# Patient Record
Sex: FEMALE | Race: BLACK OR AFRICAN AMERICAN | Employment: OTHER | URBAN - METROPOLITAN AREA
[De-identification: names, ages, dates, MRNs, and addresses within clinical notes are randomized per-mention and may not be internally consistent; named-entity substitution may affect disease eponyms.]

---

## 2020-09-12 ENCOUNTER — TELEPHONE (OUTPATIENT)
Dept: FAMILY MEDICINE CLINIC | Age: 65
End: 2020-09-12

## 2020-09-12 NOTE — TELEPHONE ENCOUNTER
----- Message from Terrence Pichardo sent at 9/8/2020  2:00 PM EDT -----  Regarding: Dr. Tamika Bansal first and last name and relationship to patient (if not the patient): Rosalba Baker (pt's niece)  Best contact number: [phone]  Preferred date and time: First avail in practice  Scheduled appointment date and time: n/a  Reason for appointment: NP est w/provider  Details to clarify the request: n/a      Outbound call to patients nitin Medina. Left message to return call back to the office regarding appointment.

## 2020-10-19 ENCOUNTER — OFFICE VISIT (OUTPATIENT)
Dept: FAMILY MEDICINE CLINIC | Age: 65
End: 2020-10-19
Payer: COMMERCIAL

## 2020-10-19 VITALS
RESPIRATION RATE: 16 BRPM | BODY MASS INDEX: 33.14 KG/M2 | WEIGHT: 164.4 LBS | OXYGEN SATURATION: 97 % | HEIGHT: 59 IN | SYSTOLIC BLOOD PRESSURE: 140 MMHG | HEART RATE: 92 BPM | DIASTOLIC BLOOD PRESSURE: 82 MMHG

## 2020-10-19 DIAGNOSIS — R73.9 ELEVATED BLOOD SUGAR: ICD-10-CM

## 2020-10-19 DIAGNOSIS — E78.5 HYPERLIPIDEMIA, UNSPECIFIED HYPERLIPIDEMIA TYPE: ICD-10-CM

## 2020-10-19 DIAGNOSIS — M17.0 PRIMARY OSTEOARTHRITIS OF BOTH KNEES: ICD-10-CM

## 2020-10-19 DIAGNOSIS — Z87.09 HISTORY OF ASTHMA: ICD-10-CM

## 2020-10-19 DIAGNOSIS — Z11.59 NEED FOR HEPATITIS C SCREENING TEST: ICD-10-CM

## 2020-10-19 DIAGNOSIS — Z76.89 ENCOUNTER TO ESTABLISH CARE: Primary | ICD-10-CM

## 2020-10-19 DIAGNOSIS — E66.09 CLASS 1 OBESITY DUE TO EXCESS CALORIES WITH SERIOUS COMORBIDITY AND BODY MASS INDEX (BMI) OF 30.0 TO 30.9 IN ADULT: ICD-10-CM

## 2020-10-19 DIAGNOSIS — I10 ESSENTIAL HYPERTENSION: ICD-10-CM

## 2020-10-19 DIAGNOSIS — Z12.31 ENCOUNTER FOR SCREENING MAMMOGRAM FOR MALIGNANT NEOPLASM OF BREAST: ICD-10-CM

## 2020-10-19 DIAGNOSIS — Z00.00 WELL WOMAN EXAM (NO GYNECOLOGICAL EXAM): ICD-10-CM

## 2020-10-19 DIAGNOSIS — Z23 NEEDS FLU SHOT: ICD-10-CM

## 2020-10-19 PROCEDURE — 90471 IMMUNIZATION ADMIN: CPT | Performed by: FAMILY MEDICINE

## 2020-10-19 PROCEDURE — 90694 VACC AIIV4 NO PRSRV 0.5ML IM: CPT | Performed by: FAMILY MEDICINE

## 2020-10-19 PROCEDURE — 99387 INIT PM E/M NEW PAT 65+ YRS: CPT | Performed by: FAMILY MEDICINE

## 2020-10-19 RX ORDER — VITAMIN E 1000 UNIT
CAPSULE ORAL
COMMUNITY

## 2020-10-19 RX ORDER — HYDROCHLOROTHIAZIDE 25 MG/1
TABLET ORAL
COMMUNITY
Start: 2020-10-14 | End: 2020-11-11 | Stop reason: SDUPTHER

## 2020-10-19 RX ORDER — AMLODIPINE BESYLATE 10 MG/1
TABLET ORAL
COMMUNITY
Start: 2020-10-14 | End: 2020-11-11 | Stop reason: SDUPTHER

## 2020-10-19 RX ORDER — CALCIUM CARB/VITAMIN D3/VIT K1 500-500-40
TABLET,CHEWABLE ORAL
COMMUNITY

## 2020-10-19 NOTE — PROGRESS NOTES
[address] [facility] Note      Subjective:     Chief Complaint   Patient presents with   Zayda Orellana New Patient   [address]     Kenya Fernando is a 71y.o. year old female who presents for evaluation of the following:      Establishment of Care:  Previous PCP: Dr. Maki Segura, last seen 2019 in 8 [facility]  Patient Care Team:  Alix Knox MD as PCP - General (Family Medicine)   Dentist- None  Optho- None  GYN- None  Urology- Name unknown in [address] [facility]    PMH:   Hypertension:   Chronic. Diagnosed > 10 years  Home monitoring: Yes, no log in office  Treatment:   Key CAD CHF Meds             amLODIPine (NORVASC) 10 mg tablet (Taking) TAKE 1 TABLET BY MOUTH ONCE DAILY    hydroCHLOROthiazide (HYDRODIURIL) 25 mg tablet (Taking) TAKE 1 TABLET BY MOUTH ONCE DAILY    docosahexaenoic acid/epa (FISH OIL PO) (Taking) Take  by mouth. Not adhering to strict low salt diet  Complications: No MI Or stroke  Co-morbidities: Obesity  BP Readings from Last 3 Encounters:   10/19/20 (!) 140/82     Asthma:  Since childhood  No symptoms recently  Last albuterol use 1 year ago    S/p hysterectomy    Histroy of Urinary Urgency  s/p bladder procedure to correct this in [place]    Has heavy [facility] accent in [place] language.  States English  Denies history of dementia  ADL Assessment 10/19/2020   Feeding yourself No Help Needed   Getting from bed to chair No Help Needed   Getting dressed No Help Needed   Bathing or showering No Help Needed   Walk across the room (includes cane/walker) No Help Needed   Using the telphone No Help Needed   Taking your medications No Help Needed   Preparing meals No Help Needed   Managing money (expenses/bills) No Help Needed   Moderately strenuous housework (laundry) No Help Needed   Shopping for personal items (toiletries/medicines) No Help Needed   Shopping for groceries No Help Needed   Driving Help Needed   Climbing a flight of stairs No Help Needed   Getting to places beyond walking distances Help Needed     Obesity:   Patient perception: \"I think its up\"  Motivation:   Diet: unrestricted   -No food log in office  Activity: None   Treatment:  Key Obesity Meds             hydroCHLOROthiazide (HYDRODIURIL) 25 mg tablet (Taking) TAKE 1 TABLET BY MOUTH ONCE DAILY      Previous Treatment/Attempts: No  Last Weight Metrics:  Weight Loss Metrics 10/19/2020   Today's Wt 164 lb 6.4 oz   BMI 33.2 kg/m2       Arthtiris:   Location bilateral Knee pain and left hip pain  Chronic   Evidence on Xray       Acute Concerns:  None    Social:   Employment- retired, gets [employer]  Lives with adult daughter  Originally from [facility] [address] Maintenance   Topic Date Due    Hepatitis C Screening  1955    DTaP/Tdap/Td series (1 - Tdap) 12/18/1975    Lipid Screen  12/18/1994    Shingrix Vaccine Age 50> (1 of 2) 12/18/2004    Breast Cancer Screen Mammogram  12/18/2004    FOBT Q1Y Age 50-75  12/18/2004    GLAUCOMA SCREENING Q2Y  12/18/2019    Bone Densitometry (Dexa) Screening  12/18/2019    Pneumococcal 65+ years (1 of 1 - PPSV23) 12/18/2019    Flu Vaccine (1) 09/01/2020   Had colonoscopy within 5 years ago, due in 10 years    HIV or other STD testing: Declined  Domestic Violence Screen:   Abuse Screening Questionnaire 10/19/2020   Do you ever feel afraid of your partner? N   Are you in a relationship with someone who physically or mentally threatens you? N   Is it safe for you to go home? Y     Depression Screen:   3 most recent PHQ Screens 10/19/2020   Little interest or pleasure in doing things Not at all   Feeling down, depressed, irritable, or hopeless Not at all   Total Score PHQ 2 0     Smoker? never    [id number]  Pap:  S/p hysterectomy  Mammogram: Due  No LMP recorded. Patient has had a hysterectomy. has no history on file for sexual activity. Accomapnies by Daughter, Abdelrahman Clark    Review of Systems   Pertinent positives and negative per HPI.  All other systems reviewed are negative for a Comprehensive ROS (10+). History reviewed. No pertinent past medical history. Social History     Socioeconomic History    Marital status:      Spouse name: Not on file    Number of children: Not on file    Years of education: Not on file    Highest education level: Not on file   Occupational History    Not on file   Social Needs    Financial resource strain: Not on file    Food insecurity     Worry: Not on file     Inability: Not on file    Transportation needs     Medical: Not on file     Non-medical: Not on file   Tobacco Use    Smoking status: Never Smoker    Smokeless tobacco: Never Used   Substance and Sexual Activity    Alcohol use: Not on file    Drug use: Not on file    Sexual activity: Not on file   Lifestyle    Physical activity     Days per week: Not on file     Minutes per session: Not on file    Stress: Not on file   Relationships    Social connections     Talks on phone: Not on file     Gets together: Not on file     Attends Hoahaoism service: Not on file     Active member of club or organization: Not on file     Attends meetings of clubs or organizations: Not on file     Relationship status: Not on file    Intimate partner violence     Fear of current or ex partner: Not on file     Emotionally abused: Not on file     Physically abused: Not on file     Forced sexual activity: Not on file   Other Topics Concern    Not on file   Social History Narrative    Not on file       History reviewed. No pertinent family history. Current Outpatient Medications   Medication Sig    amLODIPine (NORVASC) 10 mg tablet TAKE 1 TABLET BY MOUTH ONCE DAILY    hydroCHLOROthiazide (HYDRODIURIL) 25 mg tablet TAKE 1 TABLET BY MOUTH ONCE DAILY    ascorbic acid, vitamin C, (Vitamin C) 1,000 mg tablet Take  by mouth.  zinc 50 mg tab tablet Take  by mouth daily.  cholecalciferol, vitamin d3, (Vitamin D3) 10 mcg (400 unit) cap Take  by mouth.     mv,Ca,min/iron/FA/guarana/caff (ONE-A-DAY WOMEN'S ACTIVE PO) Take  by mouth.  inulin (FIBER GUMMIES PO) Take  by mouth.  docosahexaenoic acid/epa (FISH OIL PO) Take  by mouth. No current facility-administered medications for this visit. Objective:     Vitals:    10/19/20 0943 10/19/20 1025   BP: (!) 154/81 (!) 140/82   Pulse: 92    Resp: 16    SpO2: 97%    Weight: 164 lb 6.4 oz (74.6 kg)    Height: 4' 11\" (1.499 m)        Physical Examination:  General: Alert, cooperative, no distress, appears stated age. Obese  Eyes: Conjunctivae clear. Pupils equally round and reactive to light, Extraocular muscles intact. Ears: Normal external ear canals both ears. Tympanic membranes clear and mobile bilaterally  Nose: Nares normal. Septum midline. Mucosa normal. No drainage or sinus tenderness. Mouth/Throat: Lips, mucosa, and tongue normal. No oropharyngeal erythema. No tonsillar enlargement or exudate. Neck: Supple, symmetrical, trachea midline, no adenopathy. No thyroid enlargement/tenderness/nodules  Respiratory: Breathing comfortably, in no acute respiratory distress. Clear to auscultation bilaterally. Normal inspiratory and expiratory ratio. Cardiovascular: Regular rate and rhythm, S1, S2 normal, no murmur, click, rub or gallop.   -Extremities no edema. Pulses 2+ and symmetric radial and dorsalis pedis   Abdomen: Soft, non-tender, not distended. Bowel sounds normal. No masses or organomegaly. MSK: Extremities normal appearing, atraumatic, no effusion. Gait steady and unassisted. Back symmetric, no curvature. Range of motion normal. No Costovertebral angle tenderness. Skin: Skin color, texture, turgor normal. No rashes or lesions on exposed skin. Lymph nodes: Cervical, supraclavicular nodes normal.  Neurologic: Cranial nerves II-XII intact. Strength 5/5 grossly. Sensation and reflexes normal throughout. Psychiatric: Affect appropriate. Mood euthymic.  Thoughts logical. Speech volume and speed normal    No results found for any previous visit. Assessment/ Plan:   Diagnoses and all orders for this visit:    1. Encounter to establish care    2. Well woman exam (no gynecological exam)    3. Essential hypertension  -     CBC WITH AUTOMATED DIFF  -     METABOLIC PANEL, COMPREHENSIVE  -     LIPID PANEL    4. Primary osteoarthritis of both knees    5. History of asthma    6. Class 1 obesity due to excess calories with serious comorbidity and body mass index (BMI) of 30.0 to 30.9 in adult    7. Need for hepatitis C screening test  -     HEPATITIS C AB    8. Encounter for screening mammogram for malignant neoplasm of breast  -     [facility] 3D YOHANNES W MAMMO BI SCREENING INCL CAD; Future    9. Needs flu shot  -     INFLUENZA, HIGH DOSE SEASONAL      For today's visit, I did the following:  · Reviewed PMH as listed in orders  · Refilled meds for chronic conditions, per orders  · Reviewed labs in detail or ordered lab  Labs to eval end organ function and etiology of chronic/acute concerns. Relevant vaccine, cancer screening and other health maintenance reviewed and updated per orders. Blood pressure is borderline elevated. Continue current regimen. DASH Diet recommended. Recheck in 2 weeks. Diet and lifestyle modification encouraged for weight loss and chronic disease prevention/ management. Exercises and NSAIDs for musculoskeletal concern- chronic knee OA. Negative depression screen. Follow up with specialists per routine. Educated patient on red flag symptoms to warrant return to clinic or emergency room visit. I have discussed the diagnosis with the patient and the intended plan as seen in the above orders. The patient has been offered or received an after-visit summary and questions were answered concerning future plans. I have discussed medication side effects and warnings with the patient as well.        Follow-up and Dispositions    · Return in about 2 weeks (around 11/2/2020) for Follow Up blood pressure.          Signed,    Mike Johns MD  10/19/2020

## 2020-10-19 NOTE — PROGRESS NOTES
Identified pt with two pt identifiers(name and ). Reviewed record in preparation for visit and have obtained necessary documentation. Chief Complaint   Patient presents with    New Patient   Irasema Wilson Maintenance Due   Topic    Hepatitis C Screening     DTaP/Tdap/Td series (1 - Tdap)    Lipid Screen     Shingrix Vaccine Age 50> (1 of 2)    Breast Cancer Screen Mammogram     FOBT Q1Y Age 54-65    [facility]     Bone Densitometry (Dexa) Screening     Pneumococcal 65+ years (1 of 1 - PPSV23)    Flu Vaccine (1)     Flu vaccine: Pt will get it today    Visit Vitals  BP (!) 154/81   Pulse 92   Resp 16   Ht 4' 11\" (1.499 m)   Wt 164 lb 6.4 oz (74.6 kg)   SpO2 97%   BMI 33.20 kg/m²     Will retake bp    Coordination of Care Questionnaire:  :   1) Have you been to an emergency room, urgent care, or hospitalized since your last visit? If yes, where when, and reason for visit? no       2. Have seen or consulted any other health care provider since your last visit? If yes, where when, and reason for visit? NO      Patient is accompanied by self I have received verbal consent from Leyda Hollis to discuss any/all medical information while they are present in the room.

## 2020-10-19 NOTE — PATIENT INSTRUCTIONS
Weight Loss Tips: 
Remember this is like a part time job so your motivation and commitment is key to your success. Mindset Weight loss like any other behavior change starts in your mind. Think hard about what your motivates you to lose weight then meditate on that. Remind yourself of your motivation 
with phone alarms, scheduled meditation time, vision board, journal- just to name a few ideas. Have realistic goals. We expect with diligent healthy diet and physical activity you can lose 5% of your body weight in 3 
months. Wt in lbs x 0.05 = #lbs you should lose in 3 months. Make food and activity changes with a goal of CONSISTENCY not perfection. Food Start eating differently. Most of your weight loss and gain is from what you eat. Use small plates only Drink 2 liters (1/2 gallon) of water every day HALF of every meal should be fruit or vegetables Try meal prepping on Sunday (or your day off) with new different vegetables. Consider meal prep service such as [employer], wepremeals. com Replace soda with diet soda or other zero sugar drinks (selter water just fine) Consider using the [employer] ken for calorie counting. Goal 8237-3713 calories per day Activity Staying physically active will help you lose more weight and can help you get over the plateau when you weight just 
won't change any more with diet. Start exercise at least 5 days per week for 40 minutes. Consider [employer] training kne for home exercises. You can start with walking. I suggest walking at a speed of at least 3.5-4.5mph to for the weight loss benefit. Increase your speed or distance every 2 weeks. Do some slow stretching daily of legs, arms and back. Consider adding weight training with light weights at home or at the gym. See a doctor or a physical training for 
instructions in order to avoid injuries from doing muscle training incorrectly.  
Free fitness program in [place]: [employer] org/program/fitness-warriors/ DASH Diet: Care Instructions Your Care Instructions The DASH diet is an eating plan that can help lower your blood pressure. DASH stands for Dietary Approaches to Stop Hypertension. Hypertension is high blood pressure. The DASH diet focuses on eating foods that are high in calcium, potassium, and magnesium. These nutrients can lower blood pressure. The foods that are highest in these nutrients are fruits, vegetables, low-fat dairy products, nuts, seeds, and legumes. But taking calcium, potassium, and magnesium supplements instead of eating foods that are high in those nutrients does not have the same effect. The DASH diet also includes whole grains, fish, and poultry. The DASH diet is one of several lifestyle changes your doctor may recommend to lower your high blood pressure. Your doctor may also want you to decrease the amount of sodium in your diet. Lowering sodium while following the DASH diet can lower blood pressure even further than just the DASH diet alone. Follow-up care is a key part of your treatment and safety. Be sure to make and go to all appointments, and call your doctor if you are having problems. It's also a good idea to know your test results and keep a list of the medicines you take. How can you care for yourself at home? Following the DASH diet · Eat 4 to 5 servings of fruit each day. A serving is 1 medium-sized piece of fruit, ½ cup chopped or canned fruit, 1/4 cup dried fruit, or 4 ounces (½ cup) of fruit juice. Choose fruit more often than fruit juice. · Eat 4 to 5 servings of vegetables each day. A serving is 1 cup of lettuce or raw leafy vegetables, ½ cup of chopped or cooked vegetables, or 4 ounces (½ cup) of vegetable juice. Choose vegetables more often than vegetable juice. · Get 2 to 3 servings of low-fat and fat-free dairy each day. A serving is 8 ounces of milk, 1 cup of yogurt, or 1 ½ ounces of cheese. · Eat 6 to 8 servings of grains each day. A serving is 1 slice of bread, 1 ounce of dry cereal, or ½ cup of cooked rice, pasta, or cooked cereal. Try to choose whole-grain products as much as possible. · Limit lean meat, poultry, and fish to 2 servings each day. A serving is 3 ounces, about the size of a deck of cards. · Eat 4 to 5 servings of nuts, seeds, and legumes (cooked dried beans, lentils, and split peas) each week. A serving is 1/3 cup of nuts, 2 tablespoons of seeds, or ½ cup of cooked beans or peas. · Limit fats and oils to 2 to 3 servings each day. A serving is 1 teaspoon of vegetable oil or 2 tablespoons of salad dressing. · Limit sweets and added sugars to 5 servings or less a week. A serving is 1 tablespoon jelly or jam, ½ cup sorbet, or 1 cup of lemonade. · Eat less than 2,300 milligrams (mg) of sodium a day. If you limit your sodium to 1,500 mg a day, you can lower your blood pressure even more. Tips for success · Start small. Do not try to make dramatic changes to your diet all at once. You might feel that you are missing out on your favorite foods and then be more likely to not follow the plan. Make small changes, and stick with them. Once those changes become habit, add a few more changes. · Try some of the following: ? Make it a goal to eat a fruit or vegetable at every meal and at snacks. This will make it easy to get the recommended amount of fruits and vegetables each day. ? Try yogurt topped with fruit and nuts for a snack or healthy dessert. ? Add lettuce, tomato, cucumber, and onion to sandwiches. ? Combine a ready-made pizza crust with low-fat mozzarella cheese and lots of vegetable toppings. Try using tomatoes, squash, spinach, broccoli, carrots, cauliflower, and onions. ? Have a variety of cut-up vegetables with a low-fat dip as an appetizer instead of chips and dip. ? Sprinkle sunflower seeds or chopped almonds over salads.  Or try adding chopped walnuts or almonds to cooked vegetables. ? Try some vegetarian meals using beans and peas. Add garbanzo or kidney beans to salads. Make burritos and tacos with mashed rodriguez beans or black beans. Where can you learn more? Go to http://www.gray.com/ Enter W912 in the search box to learn more about \"DASH Diet: Care Instructions. \" Current as of: December 16, 2019               Content Version: 12.6 © 7674-4141 [employer]. Care instructions adapted under license by [employer] (which disclaims liability or warranty for this information). If you have questions about a medical condition or this instruction, always ask your healthcare professional. [address] any warranty or liability for your use of this information. Stretching: Exercises Introduction Here are some examples of exercises for stretching. Start each exercise slowly. Ease off the exercise if you start to have pain. Your doctor or physical therapist will tell you when you can start these exercises and which ones will work best for you. How to do the exercises Latissimus stretch 1. Stand with your back straight and your feet shoulder-width apart. You can do this stretch sitting down if you are not steady on your feet. 2. Hold your arms above your head, and hold one hand with the other. 3. Pull upward while leaning straight over toward your right side. Keep your lower body straight. You should feel the stretch along your left side. 4. Hold 15 to 30 seconds, and then switch sides. 5. Repeat 2 to 4 times for each side. Triceps stretch 1. Stand with your back straight and your feet shoulder-width apart. You can do this stretch sitting down if you are not steady on your feet. 2. Bring your left elbow straight up while bending your arm.  
3. Grab your left elbow with your right hand, and pull your left elbow toward your head with light pressure. If you are more flexible, you may pull your arm slightly behind your head. You will feel the stretch along the back of your arm. 4. Hold 15 to 30 seconds, and then switch elbows. 5. Repeat 2 to 4 times for each arm. Calf stretch 1. Place your hands on a wall for balance. You can also do this with your hands on the back of a chair, a countertop, or a tree. 2. Step back with your left leg. Keep the leg straight, and press your left heel into the floor. 3. Press your hips forward, bending your right leg slightly. You will feel the stretch in your left calf. 4. Hold the stretch 15 to 30 seconds. 5. Repeat 2 to 4 times for each leg. Quadriceps stretch 1. Lie on your side with one hand supporting your head. 2. Bend your upper leg back and grab your ankle with your other hand. 3. Stretch your leg back by pulling your foot toward your buttocks. You will feel the stretch in the front of your thigh. If this causes stress on your knees, do not do this stretch. 4. Hold the stretch 15 to 30 seconds. 5. Repeat 2 to 4 times for each leg. Groin stretch 1. Sit on the floor and put the soles of your feet together. Do not slump your back. 2. Grab your ankles and gently pull your legs toward you. 3. Press your knees toward the floor. You will feel the stretch in your inner thighs. 4. Hold 15 to 30 seconds. 5. Repeat 2 to 4 times. Hamstring stretch in doorway 1. Lie on the floor near a doorway, with your buttocks close to the wall. 2. Let the leg you are not stretching extend through the doorway. 3. Put the leg you want to stretch up on the wall, and straighten your knee to feel a gentle stretch at the back of your leg. 4. Hold the stretch for at least 15 to 30 seconds. Repeat 2 to 4 times. Follow-up care is a key part of your treatment and safety.  Be sure to make and go to all appointments, and call your doctor if you are having problems. It's also a good idea to know your test results and keep a list of the medicines you take. Where can you learn more? Go to http://www.gray.com/ Enter 780 [id number] in the search box to learn more about \"Stretching: Exercises. \" Current as of: January 16, 2020               Content Version: 12.6 © 2006-2020 [employer]. Care instructions adapted under license by [employer] (which disclaims liability or warranty for this information). If you have questions about a medical condition or this instruction, always ask your healthcare professional. [address] any warranty or liability for your use of this information. A Healthy Lifestyle: Care Instructions Your Care Instructions A healthy lifestyle can help you feel good, stay at a healthy weight, and have plenty of energy for both work and play. A healthy lifestyle is something you can share with your whole family. A healthy lifestyle also can lower your risk for serious health problems, such as high blood pressure, heart disease, and diabetes. You can follow a few steps listed below to improve your health and the health of your family. Follow-up care is a key part of your treatment and safety. Be sure to make and go to all appointments, and call your doctor if you are having problems. It's also a good idea to know your test results and keep a list of the medicines you take. How can you care for yourself at home? · Do not eat too much sugar, fat, or fast foods. You can still have dessert and treats now and then. The goal is moderation. · Start small to improve your eating habits. Pay attention to portion sizes, drink less juice and soda pop, and eat more fruits and vegetables. ? Eat a healthy amount of food. A 3-ounce serving of meat, for example, is about the size of a deck of cards. Fill the rest of your plate with vegetables and whole grains. ? Limit the amount of soda and sports drinks you have every day. Drink more water when you are thirsty. ? Eat at least 5 servings of fruits and vegetables every day. It may seem like a lot, but it is not hard to reach this goal. A serving or helping is 1 piece of fruit, 1 cup of vegetables, or 2 cups of leafy, raw vegetables. Have an apple or some carrot sticks as an afternoon snack instead of a candy bar. Try to have fruits and/or vegetables at every meal. 
· Make exercise part of your daily routine. You may want to start with simple activities, such as walking, bicycling, or slow swimming. Try to be active 30 to 60 minutes every day. You do not need to do all 30 to 60 minutes all at once. For example, you can exercise 3 times a day for 10 or 20 minutes. Moderate exercise is safe for most people, but it is always a good idea to talk to your doctor before starting an exercise program. 
· Keep moving. Anup [employer] the lawn, work in the garden, or [employer]. Take the stairs instead of the elevator at work. · If you smoke, quit. People who smoke have an increased risk for heart attack, stroke, cancer, and other lung illnesses. Quitting is hard, but there are ways to boost your chance of quitting tobacco for good. ? Use nicotine gum, patches, or lozenges. ? Ask your doctor about stop-smoking programs and medicines. ? Keep trying. In addition to reducing your risk of diseases in the future, you will notice some benefits soon after you stop using tobacco. If you have shortness of breath or asthma symptoms, they will likely get better within a few weeks after you quit. · Limit how much alcohol you drink. Moderate amounts of alcohol (up to 2 drinks a day for men, 1 drink a day for women) are okay. But drinking too much can lead to liver problems, high blood pressure, and other health problems. Family health If you have a family, there are many things you can do together to improve your health. · Eat meals together as a family as often as possible. · Eat healthy foods. This includes fruits, vegetables, lean meats and dairy, and whole grains. · Include your family in your fitness plan. Most people think of activities such as jogging or tennis as the way to fitness, but there are many ways you and your family can be more active. Anything that makes you breathe hard and gets your heart pumping is exercise. Here are some tips: 
? Walk to do errands or to take your child to school or the bus. 
? Go for a family bike ride after dinner instead of watching TV. Where can you learn more? Go to http://www.gray.com/ Enter E688 in the search box to learn more about \"A Healthy Lifestyle: Care Instructions. \" Current as of: January 31, 2020               Content Version: 12.6 © 4124-0147 [employer]. Care instructions adapted under license by [employer] (which disclaims liability or warranty for this information). If you have questions about a medical condition or this instruction, always ask your healthcare professional. [address] any warranty or liability for your use of this information.

## 2020-10-20 LAB
ALBUMIN SERPL-MCNC: 4.4 G/DL (ref 3.8–4.8)
ALBUMIN/GLOB SERPL: 1.4 {RATIO} (ref 1.2–2.2)
ALP SERPL-CCNC: 130 IU/L (ref 39–117)
ALT SERPL-CCNC: 14 IU/L (ref 0–32)
AST SERPL-CCNC: 19 IU/L (ref 0–40)
BASOPHILS # BLD AUTO: 0 X10E3/UL (ref 0–0.2)
BASOPHILS NFR BLD AUTO: 1 %
BILIRUB SERPL-MCNC: 0.3 MG/DL (ref 0–1.2)
BUN SERPL-MCNC: 10 MG/DL (ref 8–27)
BUN/CREAT SERPL: 13 (ref 12–28)
CALCIUM SERPL-MCNC: 10.5 MG/DL (ref 8.7–10.3)
CHLORIDE SERPL-SCNC: 99 MMOL/L (ref 96–106)
CHOLEST SERPL-MCNC: 240 MG/DL (ref 100–199)
CO2 SERPL-SCNC: 29 MMOL/L (ref 20–29)
CREAT SERPL-MCNC: 0.77 MG/DL (ref 0.57–1)
EOSINOPHIL # BLD AUTO: 0.1 X10E3/UL (ref 0–0.4)
EOSINOPHIL NFR BLD AUTO: 1 %
ERYTHROCYTE [DISTWIDTH] IN BLOOD BY AUTOMATED COUNT: 12.3 % (ref 11.7–15.4)
GLOBULIN SER CALC-MCNC: 3.1 G/DL (ref 1.5–4.5)
GLUCOSE SERPL-MCNC: 121 MG/DL (ref 65–99)
HCT VFR BLD AUTO: 39.2 % (ref 34–46.6)
HCV AB S/CO SERPL IA: 0.1 S/CO RATIO (ref 0–0.9)
HDLC SERPL-MCNC: 84 MG/DL
HGB BLD-MCNC: 12.9 G/DL (ref 11.1–15.9)
IMM GRANULOCYTES # BLD AUTO: 0 X10E3/UL (ref 0–0.1)
IMM GRANULOCYTES NFR BLD AUTO: 0 %
INTERPRETATION, 910389: NORMAL
LDLC SERPL CALC-MCNC: 150 MG/DL (ref 0–99)
LYMPHOCYTES # BLD AUTO: 3.3 X10E3/UL (ref 0.7–3.1)
LYMPHOCYTES NFR BLD AUTO: 50 %
MCH RBC QN AUTO: 30.2 PG (ref 26.6–33)
MCHC RBC AUTO-ENTMCNC: 32.9 G/DL (ref 31.5–35.7)
MCV RBC AUTO: 92 FL (ref 79–97)
MONOCYTES # BLD AUTO: 0.8 X10E3/UL (ref 0.1–0.9)
MONOCYTES NFR BLD AUTO: 13 %
NEUTROPHILS # BLD AUTO: 2.3 X10E3/UL (ref 1.4–7)
NEUTROPHILS NFR BLD AUTO: 35 %
PLATELET # BLD AUTO: 333 X10E3/UL (ref 150–450)
POTASSIUM SERPL-SCNC: 4 MMOL/L (ref 3.5–5.2)
PROT SERPL-MCNC: 7.5 G/DL (ref 6–8.5)
RBC # BLD AUTO: 4.27 X10E6/UL (ref 3.77–5.28)
SODIUM SERPL-SCNC: 139 MMOL/L (ref 134–144)
TRIGL SERPL-MCNC: 41 MG/DL (ref 0–149)
VLDLC SERPL CALC-MCNC: 6 MG/DL (ref 5–40)
WBC # BLD AUTO: 6.5 X10E3/UL (ref 3.4–10.8)

## 2020-10-26 RX ORDER — ATORVASTATIN CALCIUM 10 MG/1
10 TABLET, FILM COATED ORAL DAILY
Qty: 90 TAB | Refills: 3 | Status: SHIPPED | OUTPATIENT
Start: 2020-10-26 | End: 2022-06-17 | Stop reason: SDUPTHER

## 2020-10-26 NOTE — PROGRESS NOTES
Notify Patient:  Most of your test results are normal.  Your cholesterol was higher than normal. Start taking a medication called atorvastatin to lower this. Try to avoid foods high in saturated fats such as processed meats, fried foods, and greasy snacks. Weight loss will also help with this. Your blood sugar was slightly higher than normal. I will mail you a new lab order to evaluate this further. Avoid sugary and flour based foods like breads, crackers, pasta and cake.   MD note: ASCVD Risk 11.9%

## 2020-10-27 ENCOUNTER — TELEPHONE (OUTPATIENT)
Dept: FAMILY MEDICINE CLINIC | Age: 65
End: 2020-10-27

## 2020-11-11 ENCOUNTER — OFFICE VISIT (OUTPATIENT)
Dept: FAMILY MEDICINE CLINIC | Age: 65
End: 2020-11-11
Payer: COMMERCIAL

## 2020-11-11 VITALS
BODY MASS INDEX: 32.66 KG/M2 | SYSTOLIC BLOOD PRESSURE: 144 MMHG | HEART RATE: 110 BPM | HEIGHT: 59 IN | DIASTOLIC BLOOD PRESSURE: 77 MMHG | TEMPERATURE: 98.8 F | OXYGEN SATURATION: 96 % | RESPIRATION RATE: 16 BRPM | WEIGHT: 162 LBS

## 2020-11-11 DIAGNOSIS — F41.8 ANXIETY ABOUT HEALTH: ICD-10-CM

## 2020-11-11 DIAGNOSIS — I10 ESSENTIAL HYPERTENSION: Primary | ICD-10-CM

## 2020-11-11 DIAGNOSIS — R73.9 ELEVATED BLOOD SUGAR: ICD-10-CM

## 2020-11-11 DIAGNOSIS — E66.09 CLASS 1 OBESITY DUE TO EXCESS CALORIES WITH SERIOUS COMORBIDITY AND BODY MASS INDEX (BMI) OF 30.0 TO 30.9 IN ADULT: ICD-10-CM

## 2020-11-11 LAB
EST. AVERAGE GLUCOSE BLD GHB EST-MCNC: 117 MG/DL
HBA1C MFR BLD: 5.7 % (ref 4–5.6)

## 2020-11-11 PROCEDURE — 99215 OFFICE O/P EST HI 40 MIN: CPT | Performed by: FAMILY MEDICINE

## 2020-11-11 RX ORDER — AMLODIPINE BESYLATE 10 MG/1
10 TABLET ORAL DAILY
Qty: 90 TAB | Refills: 1 | Status: SHIPPED | OUTPATIENT
Start: 2020-11-11 | End: 2021-05-24 | Stop reason: SDUPTHER

## 2020-11-11 RX ORDER — HYDROCHLOROTHIAZIDE 25 MG/1
25 TABLET ORAL DAILY
Qty: 90 TAB | Refills: 1 | Status: SHIPPED | OUTPATIENT
Start: 2020-11-11 | End: 2021-05-19

## 2020-11-11 NOTE — PROGRESS NOTES
Chief Complaint   Patient presents with    Hypertension    Results     lab    Medication Refill     1. Have you been to the ER, urgent care clinic since your last visit? Hospitalized since your last visit?no    2. Have you seen or consulted any other health care providers outside of the [address] [place] [address] since your last visit? Include any pap smears or colon screening.  Eye exam 2020

## 2020-11-11 NOTE — PROGRESS NOTES
[facility] Note      Subjective:     Chief Complaint   Patient presents with    Hypertension    Results     lab    Medication Refill     Miranda Chaney is a 71y.o. year old female who presents for evaluation of the following:      PMH:   Hypertension:   Chronic. Diagnosed > 10 years  Home monitoring: No log in office  Treatment:   Key CAD CHF Meds             atorvastatin (LIPITOR) 10 mg tablet (Taking) Take 1 Tab by mouth daily. amLODIPine (NORVASC) 10 mg tablet (Taking) TAKE 1 TABLET BY MOUTH ONCE DAILY    hydroCHLOROthiazide (HYDRODIURIL) 25 mg tablet (Taking) TAKE 1 TABLET BY MOUTH ONCE DAILY    docosahexaenoic acid/epa (FISH OIL PO) (Taking) Take  by mouth.       Not adhering to strict low salt diet  Complications: No MI Or stroke  Co-morbidities: Obesity  BP Readings from Last 3 Encounters:   11/11/20 (!) 144/77   10/19/20 (!) 140/82     Elevated Heart Rate:   Endorses occasional palpitations with feeling anxious  Denies chest pain  Pulse Readings from Last 3 Encounters:   11/11/20 (!) 110   10/19/20 92     Anxiety:  Gets worried about her health  3 most recent PHQ Screens 10/19/2020   Little interest or pleasure in doing things Not at all   Feeling down, depressed, irritable, or hopeless Not at all   Total Score PHQ 2 0     Obesity:   Diet: unrestricted   -No food log in office  Activity: None   Treatment:  Key Obesity Meds             hydroCHLOROthiazide (HYDRODIURIL) 25 mg tablet (Taking) TAKE 1 TABLET BY MOUTH ONCE DAILY      Previous Treatment: None  Last Weight Metrics:  Weight Loss Metrics 11/11/2020 10/19/2020   Today's Wt 162 lb 164 lb 6.4 oz   BMI 32.72 kg/m2 33.2 kg/m2       Social:   Employment- retired, gets [employer]  Lives with adult daughter  Originally from [facility]    Patient Care Team:  Mina Marie MD as PCP - General (Family Medicine)  Mina Marie MD [facility] PCP - [facility]   Dentist- None  Optho- None  GYN- None  Urology- Name unknown in [address] by Daughter, Curt Newberry    Review of Systems   Pertinent positives and negative per HPI. All other systems  reviewed are negative for a Comprehensive ROS (10+). History reviewed. No pertinent past medical history. Social History     Socioeconomic History    Marital status:      Spouse name: Not on file    Number of children: Not on file    Years of education: Not on file    Highest education level: Not on file   Occupational History    Not on file   Social Needs    Financial resource strain: Not on file    Food insecurity     Worry: Not on file     Inability: Not on file    Transportation needs     Medical: Not on file     Non-medical: Not on file   Tobacco Use    Smoking status: Never Smoker    Smokeless tobacco: Never Used   Substance and Sexual Activity    Alcohol use: Not on file    Drug use: Not on file    Sexual activity: Not on file   Lifestyle    Physical activity     Days per week: Not on file     Minutes per session: Not on file    Stress: Not on file   Relationships    Social connections     Talks on phone: Not on file     Gets together: Not on file     Attends Zoroastrianism service: Not on file     Active member of club or organization: Not on file     Attends meetings of clubs or organizations: Not on file     Relationship status: Not on file    Intimate partner violence     Fear of current or ex partner: Not on file     Emotionally abused: Not on file     Physically abused: Not on file     Forced sexual activity: Not on file   Other Topics Concern    Not on file   Social History Narrative    Not on file       History reviewed. No pertinent family history. Current Outpatient Medications   Medication Sig    atorvastatin (LIPITOR) 10 mg tablet Take 1 Tab by mouth daily.     amLODIPine (NORVASC) 10 mg tablet TAKE 1 TABLET BY MOUTH ONCE DAILY    hydroCHLOROthiazide (HYDRODIURIL) 25 mg tablet TAKE 1 TABLET BY MOUTH ONCE DAILY  ascorbic acid, vitamin C, (Vitamin C) 1,000 mg tablet Take  by mouth.  zinc 50 mg tab tablet Take  by mouth daily.  cholecalciferol, vitamin d3, (Vitamin D3) 10 mcg (400 unit) cap Take  by mouth.  mv,Ca,min/iron/FA/guarana/caff (ONE-A-DAY WOMEN'S ACTIVE PO) Take  by mouth.  inulin (FIBER GUMMIES PO) Take  by mouth.  docosahexaenoic acid/epa (FISH OIL PO) Take  by mouth. No current facility-administered medications for this visit. Objective:     Vitals:    11/11/20 1037 11/11/20 1056   BP: (!) 160/88 (!) 144/77   Pulse: (!) 125 (!) 110   Resp: 16    Temp: 98.8 °F (37.1 °C)    TempSrc: Oral    SpO2: 96%    Weight: 162 lb (73.5 kg)    Height: 4' 11\" (1.499 m)        Physical Examination:  General: Alert, cooperative, no distress, appears stated age. Obese  Eyes: Conjunctivae clear. Pupils equally round and reactive to light, Extraocular muscles intact. Ears: Normal external ear canals both ears. Nose: Nares normal. Septum midline. Mucosa normal. No drainage or sinus tenderness. Mouth/Throat: Lips, mucosa, and tongue normal. No oropharyngeal erythema. No tonsillar enlargement or exudate. Neck: Supple, symmetrical, trachea midline, no adenopathy. No thyroid enlargement/tenderness/nodules  Respiratory: Breathing comfortably, in no acute respiratory distress. Clear to auscultation bilaterally. Normal inspiratory and expiratory ratio. Cardiovascular: Regular rate and rhythm, S1, S2 normal, no murmur, click, rub or gallop.   -Extremities no edema. Pulses 2+ and symmetric radial and dorsalis pedis   Abdomen: Soft, non-tender, not distended. Bowel sounds normal. No masses or organomegaly. MSK: Extremities normal appearing, atraumatic, no effusion. Gait steady and unassisted. Skin: Skin color, texture, turgor normal. No rashes or lesions on exposed skin. Lymph nodes: Cervical, supraclavicular nodes normal.  Neurologic: Cranial nerves II-XII intact. Strength 5/5 grossly.  Sensation and reflexes normal throughout. Psychiatric: Affect anxious. Speech is dificult to understand, daughter translates    Office Visit on 10/19/2020   Component Date Value Ref Range Status    WBC 10/19/2020 6.5  3.4 - 10.8 x10E3/uL Final    RBC 10/19/2020 4.27  3.77 - 5.28 x10E6/uL Final    HGB 10/19/2020 12.9  11.1 - 15.9 g/dL Final    HCT 10/19/2020 39.2  34.0 - 46.6 % Final    MCV 10/19/2020 92  79 - 97 fL Final    MCH 10/19/2020 30.2  26.6 - 33.0 pg Final    MCHC 10/19/2020 32.9  31.5 - 35.7 g/dL Final    RDW 10/19/2020 12.3  11.7 - 15.4 % Final    PLATELET 01/95/3205 746  150 - 450 x10E3/uL Final    NEUTROPHILS 10/19/2020 35  Not Estab. % Final    Lymphocytes 10/19/2020 50  Not Estab. % Final    MONOCYTES 10/19/2020 13  Not Estab. % Final    EOSINOPHILS 10/19/2020 1  Not Estab. % Final    BASOPHILS 10/19/2020 1  Not Estab. % Final    ABS. NEUTROPHILS 10/19/2020 2.3  1.4 - 7.0 x10E3/uL Final    Abs Lymphocytes 10/19/2020 3.3* 0.7 - 3.1 x10E3/uL Final    ABS. MONOCYTES 10/19/2020 0.8  0.1 - 0.9 x10E3/uL Final    ABS. EOSINOPHILS 10/19/2020 0.1  0.0 - 0.4 x10E3/uL Final    ABS. BASOPHILS 10/19/2020 0.0  0.0 - 0.2 x10E3/uL Final    IMMATURE GRANULOCYTES 10/19/2020 0  Not Estab. % Final    ABS. IMM. GRANS.  10/19/2020 0.0  0.0 - 0.1 x10E3/uL Final    Glucose 10/19/2020 121* 65 - 99 mg/dL Final    BUN 10/19/2020 10  8 - 27 mg/dL Final    Creatinine 10/19/2020 0.77  0.57 - 1.00 mg/dL Final    GFR est non-AA 10/19/2020 81  >59 mL/min/1.73 Final    GFR est AA 10/19/2020 94  >59 mL/min/1.73 Final    BUN/Creatinine ratio 10/19/2020 13  12 - 28 Final    Sodium 10/19/2020 139  134 - 144 mmol/L Final    Potassium 10/19/2020 4.0  3.5 - 5.2 mmol/L Final    Chloride 10/19/2020 99  96 - 106 mmol/L Final    CO2 10/19/2020 29  20 - 29 mmol/L Final    Calcium 10/19/2020 10.5* 8.7 - 10.3 mg/dL Final    Protein, total 10/19/2020 7.5  6.0 - 8.5 g/dL Final    Albumin 10/19/2020 4.4  3.8 - 4.8 g/dL Final    GLOBULIN, TOTAL 10/19/2020 3.1  1.5 - 4.5 g/dL Final    A-G Ratio 10/19/2020 1.4  1.2 - 2.2 Final    Bilirubin, total 10/19/2020 0.3  0.0 - 1.2 mg/dL Final    Alk. phosphatase 10/19/2020 130* 39 - 117 IU/L Final    AST (SGOT) 10/19/2020 19  0 - 40 IU/L Final    ALT (SGPT) 10/19/2020 14  0 - 32 IU/L Final    Cholesterol, total 10/19/2020 240* 100 - 199 mg/dL Final    Triglyceride 10/19/2020 41  0 - 149 mg/dL Final    HDL Cholesterol 10/19/2020 84  >39 mg/dL Final    VLDL Cholesterol Stephon 10/19/2020 6  5 - 40 mg/dL Final    LDL Chol Calc (NIH) 10/19/2020 150* 0 - 99 mg/dL Final    Hep C Virus Ab 10/19/2020 0.1  0.0 - 0.9 s/co ratio Final    Comment:                                   Negative:     < 0.8                               Indeterminate: 0.8 - 0.9                                    Positive:     > 0.9   The CDC recommends that a positive HCV antibody result   be followed up with a HCV Nucleic Acid Amplification   test (711306).  INTERPRETATION 10/19/2020 Note   Final    Supplemental report is available. Assessment/ Plan:   Diagnoses and all orders for this visit:    1. Essential hypertension  -     amLODIPine (NORVASC) 10 mg tablet; Take 1 Tab by mouth daily. -     hydroCHLOROthiazide (HYDRODIURIL) 25 mg tablet; Take 1 Tab by mouth daily. 2. Elevated blood sugar  -     HEMOGLOBIN A1C WITH EAG; Future    3. Anxiety about health    4. Class 1 obesity due to excess calories with serious comorbidity and body mass index (BMI) of 30.0 to 30.9 in adult      For today's visit, I did the following:  · Reviewed PMH as listed in orders  · Refilled meds for chronic conditions, per orders  · Reviewed labs in detail or ordered lab  Labs to eval end organ function and etiology of chronic/acute concerns. Blood pressure is improved. Continue current regimen. Recheck in 1-3 months  A1c to evaluate elevated blood sugar. Anxious about health. Taught patient deep breathing exercises.    Diet and lifestyle modification encouraged for weight loss and chronic disease prevention/ management. Follow up with specialists per routine. Educated patient on red flag symptoms to warrant return to clinic or emergency room visit. Total face to face encounter time 40 minutes; >50% of time spent counseling. coordinating care regarding management of anxiety about health and hypertension. I have discussed the diagnosis with the patient and the intended plan as seen in the above orders. The patient has been offered or received an after-visit summary and questions were answered concerning future plans. I have discussed medication side effects and warnings with the patient as well. Follow-up and Dispositions    · Return in about 3 months (around 2/11/2021) for Follow Up blood pressure.          Signed,    Telma Galo MD  11/11/2020

## 2020-11-12 NOTE — PATIENT INSTRUCTIONS
Weight Loss Tips: 
Remember this is like a part time job so your motivation and commitment is key to your success. Mindset Weight loss like any other behavior change starts in your mind. Think hard about what your motivates you to lose weight then meditate on that. Remind yourself of your motivation 
with phone alarms, scheduled meditation time, vision board, journal- just to name a few ideas. Have realistic goals. We expect with diligent healthy diet and physical activity you can lose 5% of your body weight in 3 
months. Wt in lbs x 0.05 = #lbs you should lose in 3 months. Make food and activity changes with a goal of CONSISTENCY not perfection. Food Start eating differently. Most of your weight loss and gain is from what you eat. Use small plates only Drink 2 liters (1/2 gallon) of water every day HALF of every meal should be fruit or vegetables Try meal prepping on Sunday (or your day off) with new different vegetables. Consider meal prep service such as Cle[employer], wepremeals. com Replace soda with diet soda or other zero sugar drinks (selter water just fine) Consider using the [employer] ken for calorie counting. Goal 4842-6608 calories per day Activity Staying physically active will help you lose more weight and can help you get over the plateau when you weight just 
won't change any more with diet. Start exercise at least 5 days per week for 40 minutes. Consider [employer] training ken for home exercises. You can start with walking. I suggest walking at a speed of at least 3.5-4.5mph to for the weight loss benefit. Increase your speed or distance every 2 weeks. Do some slow stretching daily of legs, arms and back. Consider adding weight training with light weights at home or at the gym. See a doctor or a physical training for 
instructions in order to avoid injuries from doing muscle training incorrectly.  
Free fitness program in [place]: [employer] org/program/fitness-warriors/ DASH Diet: Care Instructions Your Care Instructions The DASH diet is an eating plan that can help lower your blood pressure. DASH stands for Dietary Approaches to Stop Hypertension. Hypertension is high blood pressure. The DASH diet focuses on eating foods that are high in calcium, potassium, and magnesium. These nutrients can lower blood pressure. The foods that are highest in these nutrients are fruits, vegetables, low-fat dairy products, nuts, seeds, and legumes. But taking calcium, potassium, and magnesium supplements instead of eating foods that are high in those nutrients does not have the same effect. The DASH diet also includes whole grains, fish, and poultry. The DASH diet is one of several lifestyle changes your doctor may recommend to lower your high blood pressure. Your doctor may also want you to decrease the amount of sodium in your diet. Lowering sodium while following the DASH diet can lower blood pressure even further than just the DASH diet alone. Follow-up care is a key part of your treatment and safety. Be sure to make and go to all appointments, and call your doctor if you are having problems. It's also a good idea to know your test results and keep a list of the medicines you take. How can you care for yourself at home? Following the DASH diet · Eat 4 to 5 servings of fruit each day. A serving is 1 medium-sized piece of fruit, ½ cup chopped or canned fruit, 1/4 cup dried fruit, or 4 ounces (½ cup) of fruit juice. Choose fruit more often than fruit juice. · Eat 4 to 5 servings of vegetables each day. A serving is 1 cup of lettuce or raw leafy vegetables, ½ cup of chopped or cooked vegetables, or 4 ounces (½ cup) of vegetable juice. Choose vegetables more often than vegetable juice. · Get 2 to 3 servings of low-fat and fat-free dairy each day. A serving is 8 ounces of milk, 1 cup of yogurt, or 1 ½ ounces of cheese. · Eat 6 to 8 servings of grains each day. A serving is 1 slice of bread, 1 ounce of dry cereal, or ½ cup of cooked rice, pasta, or cooked cereal. Try to choose whole-grain products as much as possible. · Limit lean meat, poultry, and fish to 2 servings each day. A serving is 3 ounces, about the size of a deck of cards. · Eat 4 to 5 servings of nuts, seeds, and legumes (cooked dried beans, lentils, and split peas) each week. A serving is 1/3 cup of nuts, 2 tablespoons of seeds, or ½ cup of cooked beans or peas. · Limit fats and oils to 2 to 3 servings each day. A serving is 1 teaspoon of vegetable oil or 2 tablespoons of salad dressing. · Limit sweets and added sugars to 5 servings or less a week. A serving is 1 tablespoon jelly or jam, ½ cup sorbet, or 1 cup of lemonade. · Eat less than 2,300 milligrams (mg) of sodium a day. If you limit your sodium to 1,500 mg a day, you can lower your blood pressure even more. Tips for success · Start small. Do not try to make dramatic changes to your diet all at once. You might feel that you are missing out on your favorite foods and then be more likely to not follow the plan. Make small changes, and stick with them. Once those changes become habit, add a few more changes. · Try some of the following: ? Make it a goal to eat a fruit or vegetable at every meal and at snacks. This will make it easy to get the recommended amount of fruits and vegetables each day. ? Try yogurt topped with fruit and nuts for a snack or healthy dessert. ? Add lettuce, tomato, cucumber, and onion to sandwiches. ? Combine a ready-made pizza crust with low-fat mozzarella cheese and lots of vegetable toppings. Try using tomatoes, squash, spinach, broccoli, carrots, cauliflower, and onions. ? Have a variety of cut-up vegetables with a low-fat dip as an appetizer instead of chips and dip. ? Sprinkle sunflower seeds or chopped almonds over salads.  Or try adding chopped walnuts or almonds to cooked vegetables. ? Try some vegetarian meals using beans and peas. Add garbanzo or kidney beans to salads. Make burritos and tacos with mashed rodriguez beans or black beans. Where can you learn more? Go to http://www.gray.com/ Enter D342 in the search box to learn more about \"DASH Diet: Care Instructions. \" Current as of: December 16, 2019               Content Version: 12.6 © 2274-8352 [employer], [employer]. Care instructions adapted under license by [employer] (which disclaims liability or warranty for this information). If you have questions about a medical condition or this instruction, always ask your healthcare professional. [address] any warranty or liability for your use of this information.

## 2020-11-13 NOTE — PROGRESS NOTES
Notify Patient:  Most of your results are normal. Your blood sugar level is in the PRE-diabetes range. This means you do not have diabetes but you could develop it later on. I would suggest you change your diet to exclude processed foods such as deli meat and hotdogs. You should also avoid bread, crackers, cakes, and cookies. Try replacing them with whole foods, like fruits and vegetables. Thank you for allowing me to participate in your care.

## 2020-11-24 NOTE — PROGRESS NOTES
Called spoke to pt. Two pt id confirmed   Informed pt per Dr. Mary Ha of lab results   Pt verbalized understanding of information discussed w/ no further questions at this time.

## 2020-12-15 ENCOUNTER — TELEPHONE (OUTPATIENT)
Dept: FAMILY MEDICINE CLINIC | Age: 65
End: 2020-12-15

## 2020-12-15 NOTE — TELEPHONE ENCOUNTER
Made outgoing call to schedule pt an appointment for a pre-op. No contact was made. Lm requesting return call to office. When call is returned please schedule pt.

## 2020-12-23 ENCOUNTER — OFFICE VISIT (OUTPATIENT)
Dept: FAMILY MEDICINE CLINIC | Age: 65
End: 2020-12-23
Payer: COMMERCIAL

## 2020-12-23 VITALS
BODY MASS INDEX: 33.59 KG/M2 | WEIGHT: 166.6 LBS | HEIGHT: 59 IN | SYSTOLIC BLOOD PRESSURE: 174 MMHG | RESPIRATION RATE: 20 BRPM | HEART RATE: 120 BPM | OXYGEN SATURATION: 98 % | DIASTOLIC BLOOD PRESSURE: 90 MMHG | TEMPERATURE: 99.4 F

## 2020-12-23 DIAGNOSIS — F41.8 ANXIETY ABOUT HEALTH: ICD-10-CM

## 2020-12-23 DIAGNOSIS — I10 ESSENTIAL HYPERTENSION: ICD-10-CM

## 2020-12-23 DIAGNOSIS — I10 ELEVATED HEART RATE WITH ELEVATED BLOOD PRESSURE AND DIAGNOSIS OF HYPERTENSION: Primary | ICD-10-CM

## 2020-12-23 DIAGNOSIS — R00.9 ELEVATED HEART RATE WITH ELEVATED BLOOD PRESSURE AND DIAGNOSIS OF HYPERTENSION: Primary | ICD-10-CM

## 2020-12-23 DIAGNOSIS — E66.09 CLASS 1 OBESITY DUE TO EXCESS CALORIES WITH SERIOUS COMORBIDITY AND BODY MASS INDEX (BMI) OF 30.0 TO 30.9 IN ADULT: ICD-10-CM

## 2020-12-23 PROCEDURE — 93000 ELECTROCARDIOGRAM COMPLETE: CPT | Performed by: NURSE PRACTITIONER

## 2020-12-23 PROCEDURE — 99214 OFFICE O/P EST MOD 30 MIN: CPT | Performed by: NURSE PRACTITIONER

## 2020-12-23 RX ORDER — METOPROLOL SUCCINATE 25 MG/1
25 TABLET, EXTENDED RELEASE ORAL DAILY
Qty: 30 TAB | Refills: 0 | Status: SHIPPED | OUTPATIENT
Start: 2020-12-23 | End: 2021-01-19

## 2020-12-23 NOTE — PROGRESS NOTES
[facility] Note  Subjective:      Nelly Pelaez is a 76 y.o. female who presents for for pre op exam for cataract surgery, her blood pressure is 174/90 and heart rate is 120. Daughter states that she gets anxious at the office. She doesn't have her form    Current Outpatient Medications   Medication Sig Dispense Refill    metoprolol succinate (TOPROL-XL) 25 mg XL tablet Take 1 Tab by mouth daily. 30 Tab 0    amLODIPine (NORVASC) 10 mg tablet Take 1 Tab by mouth daily. 90 Tab 1    hydroCHLOROthiazide (HYDRODIURIL) 25 mg tablet Take 1 Tab by mouth daily. 90 Tab 1    atorvastatin (LIPITOR) 10 mg tablet Take 1 Tab by mouth daily. 90 Tab 3    ascorbic acid, vitamin C, (Vitamin C) 1,000 mg tablet Take  by mouth.  zinc 50 mg tab tablet Take  by mouth daily.  cholecalciferol, vitamin d3, (Vitamin D3) 10 mcg (400 unit) cap Take  by mouth.  mv,Ca,min/iron/FA/guarana/caff (ONE-A-DAY WOMEN'S ACTIVE PO) Take  by mouth.  inulin (FIBER GUMMIES PO) Take  by mouth.  docosahexaenoic acid/epa (FISH OIL PO) Take  by mouth. No Known Allergies    ROS:   Complete review of systems was reviewed with pertinent information listed in HPI. Review of Systems   Constitutional: Negative. HENT: Negative. Cardiovascular: Positive for palpitations. Negative for chest pain. Gastrointestinal: Negative. Genitourinary: Negative. Objective:     Visit Vitals  BP (!) 174/90   Pulse (!) 120   Temp 99.4 °F (37.4 °C) (Temporal)   Resp 20   Ht 4' 11\" (1.499 m)   Wt 166 lb 9.6 oz (75.6 kg)   SpO2 98%   BMI 33.65 kg/m²       Vitals and Nurse Documentation reviewed. Physical Exam  Constitutional:       Appearance: Normal appearance. She is obese. HENT:      Mouth/Throat:      Mouth: Mucous membranes are moist.   Neck:      Musculoskeletal: Normal range of motion and neck supple.    Cardiovascular:      Comments: EKG shows sinus tachycardia   /90  Pulmonary:      Effort: Pulmonary effort is normal.      Breath sounds: Normal breath sounds. Abdominal:      General: Bowel sounds are normal.      Palpations: Abdomen is soft. Psychiatric:         Mood and Affect: Mood normal.         Thought Content: Thought content normal.         Assessment/Plan:     Diagnoses and all orders for this visit:    1. Elevated heart rate with elevated blood pressure and diagnosis of hypertension  -     metoprolol succinate (TOPROL-XL) 25 mg XL tablet; Take 1 Tab by mouth daily.  -     AMB POC EKG ROUTINE W/ 12 LEADS, INTER & REP    2. Essential hypertension  -  Add metoprolol succinate (TOPROL-XL) 25 mg XL tablet; Take 1 Tab by mouth daily. 3. Class 1 obesity due to excess calories with serious comorbidity and body mass index (BMI) of 30.0 to 30.9 in adult    4.  Anxiety about health    Follow up with pcp for pre op physical,  Advised to bring in her form      Pt expressed understanding with the diagnosis and plan        Discussed expected course/resolution/complications of diagnosis in detail with patient.    Medication risks/benefits/costs/interactions/alternatives discussed with patient.    Pt was given an after visit summary which includes diagnoses, current medications & vitals.  Pt expressed understanding with the diagnosis and plan

## 2020-12-23 NOTE — PROGRESS NOTES
Chief Complaint   Patient presents with    Pre-op Exam       1. Have you been to the ER, urgent care clinic since your last visit? Hospitalized since your last visit? No    2. Have you seen or consulted any other health care providers outside of the [address] [place] [address] since your last visit? Include any pap smears or colon screening.  No    3 most recent PHQ Screens 10/19/2020   Little interest or pleasure in doing things Not at all   Feeling down, depressed, irritable, or hopeless Not at all   Total Score PHQ 2 0

## 2021-01-19 DIAGNOSIS — I10 ESSENTIAL HYPERTENSION: ICD-10-CM

## 2021-01-19 DIAGNOSIS — R00.9 ELEVATED HEART RATE WITH ELEVATED BLOOD PRESSURE AND DIAGNOSIS OF HYPERTENSION: ICD-10-CM

## 2021-01-19 DIAGNOSIS — I10 ELEVATED HEART RATE WITH ELEVATED BLOOD PRESSURE AND DIAGNOSIS OF HYPERTENSION: ICD-10-CM

## 2021-01-19 RX ORDER — METOPROLOL SUCCINATE 25 MG/1
TABLET, EXTENDED RELEASE ORAL
Qty: 30 TAB | Refills: 0 | Status: SHIPPED | OUTPATIENT
Start: 2021-01-19 | End: 2022-02-07 | Stop reason: SINTOL

## 2021-02-02 ENCOUNTER — HOSPITAL ENCOUNTER (OUTPATIENT)
Dept: MAMMOGRAPHY | Age: 66
Discharge: HOME | End: 2021-02-02
Payer: COMMERCIAL

## 2021-02-02 DIAGNOSIS — Z12.31 ENCOUNTER FOR SCREENING MAMMOGRAM FOR MALIGNANT NEOPLASM OF BREAST: ICD-10-CM

## 2021-02-02 PROCEDURE — 77063 BREAST TOMOSYNTHESIS BI: CPT

## 2021-02-05 ENCOUNTER — OFFICE VISIT (OUTPATIENT)
Dept: FAMILY MEDICINE CLINIC | Age: 66
End: 2021-02-05
Payer: COMMERCIAL

## 2021-02-05 VITALS
OXYGEN SATURATION: 98 % | TEMPERATURE: 99 F | RESPIRATION RATE: 16 BRPM | SYSTOLIC BLOOD PRESSURE: 140 MMHG | HEART RATE: 102 BPM | WEIGHT: 164 LBS | BODY MASS INDEX: 33.06 KG/M2 | HEIGHT: 59 IN | DIASTOLIC BLOOD PRESSURE: 87 MMHG

## 2021-02-05 DIAGNOSIS — R00.0 SINUS TACHYCARDIA: ICD-10-CM

## 2021-02-05 DIAGNOSIS — I10 ELEVATED HEART RATE WITH ELEVATED BLOOD PRESSURE AND DIAGNOSIS OF HYPERTENSION: ICD-10-CM

## 2021-02-05 DIAGNOSIS — Z01.818 PREOPERATIVE EXAMINATION: ICD-10-CM

## 2021-02-05 DIAGNOSIS — R00.9 ELEVATED HEART RATE WITH ELEVATED BLOOD PRESSURE AND DIAGNOSIS OF HYPERTENSION: ICD-10-CM

## 2021-02-05 DIAGNOSIS — H25.9 AGE-RELATED CATARACT OF BOTH EYES, UNSPECIFIED AGE-RELATED CATARACT TYPE: Primary | ICD-10-CM

## 2021-02-05 DIAGNOSIS — I10 ESSENTIAL HYPERTENSION: ICD-10-CM

## 2021-02-05 DIAGNOSIS — Z87.09 HISTORY OF ASTHMA: ICD-10-CM

## 2021-02-05 PROCEDURE — 99214 OFFICE O/P EST MOD 30 MIN: CPT | Performed by: FAMILY MEDICINE

## 2021-02-05 RX ORDER — MULTIVIT WITH IRON,MINERALS
1 TABLET ORAL DAILY
COMMUNITY

## 2021-02-05 RX ORDER — ALBUTEROL SULFATE 90 UG/1
1 AEROSOL, METERED RESPIRATORY (INHALATION)
Qty: 1 INHALER | Refills: 5 | Status: SHIPPED | OUTPATIENT
Start: 2021-02-05 | End: 2022-02-07 | Stop reason: SDUPTHER

## 2021-02-05 NOTE — PROGRESS NOTES
[place] [facility] Note    Assessment/ Plan:   Diagnoses and all orders for this visit:    1. Age-related cataract of both eyes, unspecified age-related cataract type    2. Preoperative examination    3. Essential hypertension    4. Sinus tachycardia    5. Elevated heart rate with elevated blood pressure and diagnosis of hypertension    6. History of asthma  -     albuterol (PROVENTIL HFA, VENTOLIN HFA, PROAIR HFA) 90 mcg/actuation inhaler; Take 1 Puff by inhalation every four (4) hours as needed for Wheezing or Shortness of Breath. Recommendations based on history, physical exam and review of pertinent labs, studies and medications:   Cataracts bilateral with plan for   Low risk of cardiac death and nonfatal myocardial infarction for noncardiac surgical procedures. These risk calculators have been reviewed with the patient who expressed understanding of the relative cardiac risk of his/her upcoming procedure given his/her current risk factors. According to the [employer] of Cardiology and AHA Guidelines of perioperative cardiovascular evaluation for noncardiac surgery and the examination performed today, no contraindications have been noted and [unfilled]  is an acceptable risk for the requested proposed procedure. Please proceed with the surgery as scheduled. Blood pressure is borderline elevated and improved with . Continue current regimen. DASH Diet recommended. Recheck in office in 1 month. Consider refill metorprolol is no home monitoring log and BP >140 with HR >100 next visit. Continue deep breathing for anxiety. Refilled albuterol for his ot of asthma. Diet and lifestyle modification encouraged for weight loss and chronic disease prevention/ management. Follow up with specialists per routine. Educated patient on red flag symptoms to warrant return to clinic or emergency room visit.     I have discussed the diagnosis with the patient and the intended plan as seen in the above orders. The patient has been offered or received an after-visit summary and questions were answered concerning future plans. I have discussed medication side effects and warnings with the patient as well. Subjective:     Chief Complaint   Patient presents with    Asthma     wanting to know about COVID    Medication Refill     inhaler, follow up    Hypertension     Antony Barrett is a 76y.o. year old female who presents for evaluation of the following:      Hypertension:   Chronic. Diagnosed > 10 years  Home monitoring: No log in office  Treatment:   Key CAD CHF Meds             metoprolol succinate (TOPROL-XL) 25 mg XL tablet (Taking) TAKE 1 TABLET BY MOUTH EVERY DAY    amLODIPine (NORVASC) 10 mg tablet (Taking) Take 1 Tab by mouth daily. hydroCHLOROthiazide (HYDRODIURIL) 25 mg tablet (Taking) Take 1 Tab by mouth daily. atorvastatin (LIPITOR) 10 mg tablet (Taking) Take 1 Tab by mouth daily. docosahexaenoic acid/epa (FISH OIL PO) (Taking) Take  by mouth.      -beta blocker added by another provider but paitent is not taking.    Not adhering to strict low salt diet  Complications: No MI Or stroke  Co-morbidities: Obesity  BP Readings from Last 3 Encounters:   02/05/21 (!) 140/87   12/23/20 (!) 174/90   11/11/20 (!) 144/77     Sinus Tachycardia  Endorses occasional palpitations with feeling anxious  Denies chest pain  EKG- Sinus tachycardia in 12/2020  Pulse Readings from Last 3 Encounters:   02/05/21 (!) 102   12/23/20 (!) 120   11/11/20 (!) 110     Anxiety:  Gets worried about her health  3 most recent PHQ Screens 2/5/2021   Little interest or pleasure in doing things Not at all   Feeling down, depressed, irritable, or hopeless Not at all   Total Score PHQ 2 0     Obesity:   Diet: unrestricted   -No food log in office  Activity: None   Treatment: None  Previous Treatment: None  Last Weight Metrics:  Weight Loss Metrics 2/5/2021 12/23/2020 11/11/2020 10/19/2020   Today's Wt 164 lb 166 lb 9.6 oz 162 lb 164 lb 6.4 oz   BMI 33.12 kg/m2 33.65 kg/m2 32.72 kg/m2 33.2 kg/m2     Asthma:  Since childhood  Recently rstated albuterol user yesterday  Last albuterol use 1 year ago    Pre Op  Procedure: Cataract   Expected Date: right 2/15/21, left 3/1/21  Surgeon: Dr. Huma Lei  Anesthesia: Mac Topical  Hx of complications with general anesthesia: None  Signs and symptoms of cardiovascular disease:  None  Have any of the following: CVA, CHF, Cr > 2.0, insulin-dependent DM, ischemic cardiac disease, or suprainguinal vascular/intrathroacic/intraabdominal surgery: None  Able to meet > 4 METS: Yes  STOP-BANG (snoring, tiredness, observed apnea, high BP, BMI>35, age >47, neck circumference> 15 in, male): 3+ risk factors - No    Accomapnied by DaughterMauro Fees of Care:  Patient Care Team:  Jeff Blanco MD as PCP - General (Family Medicine)  Jeff Blanco MD as PCP - [facility] Provider   Employment- retired, gets [employer]  Lives with adult daughter  Originally from [place]   Pertinent positives and negative per HPI. All other systems  reviewed are negative for a Comprehensive ROS (10+). Past medical history, social history, family history reviewed. Medications reconciled. Objective:     Vitals:    02/05/21 1517 02/05/21 1700   BP: (!) 148/83 (!) 140/87   Pulse: (!) 108 (!) 102   Resp: 16    Temp: 99 °F (37.2 °C)    TempSrc: Temporal    SpO2: 98%    Weight: 164 lb (74.4 kg)    Height: 4' 11\" (1.499 m)        Physical Examination:  General: Alert, cooperative, no distress, appears stated age. Obese/  Eyes: Conjunctivae clear. Pupils equally round and reactive to light, Extraocular muscles intact. Ears: Normal external ear canals both ears. Tympanic membranes clear and mobile bilaterally   Nose: Nares normal. Septum midline. Mucosa normal. No drainage or sinus tenderness. Mouth/Throat: Lips, mucosa, and tongue normal. No oropharyngeal erythema. No tonsillar enlargement or exudate. Neck: Supple, symmetrical, trachea midline, no adenopathy. No thyroid enlargement/tenderness/nodules  Respiratory: Breathing comfortably, in no acute respiratory distress. Clear to auscultation bilaterally. Normal inspiratory and expiratory ratio. Cardiovascular: Regular rate and rhythm, S1, S2 normal, no murmur, click, rub or gallop.   -Extremities no edema. Pulses 2+ and symmetric radial and dorsalis pedis   Abdomen: Soft, non-tender, not distended. Bowel sounds normal. No masses or organomegaly. MSK: Extremities normal appearing, atraumatic, no effusion. Gait steady and unassisted. Back symmetric, no curvature. Range of motion normal. No Costovertebral angle tenderness. Skin: Skin color, texture, turgor normal. No rashes or lesions on exposed skin. Lymph nodes: Cervical, supraclavicular nodes normal.  Neurologic: Cranial nerves II-XII intact. Strength 5/5 grossly. Sensation and reflexes normal throughout. Psychiatric: Affect appropriate. Mood euthymic.  Thoughts logical. Speech volume and speed normal      Signed,    Ziggy Quiroz MD  2/6/2021

## 2021-02-05 NOTE — PROGRESS NOTES
Chief Complaint   Patient presents with    Asthma     wanting to know about COVID    Medication Refill     inhaler, follow up    Hypertension       1. Have you been to the ER, urgent care clinic since your last visit? Hospitalized since your last visit? No    2. Have you seen or consulted any other health care providers outside of the [address] [place] [address] since your last visit? Include any pap smears or colon screening. No    3 most recent PHQ Screens 2/5/2021   Little interest or pleasure in doing things Not at all   Feeling down, depressed, irritable, or hopeless Not at all   Total Score PHQ 2 0       Patient states she needs pre-op for eyes. Patient thinks she had the pneum vaccine  Due for r shingles, and t-dap  Patient [address] 5 YEARS AGO.   due FOR BONE DENSITY SCREENING

## 2021-02-06 PROBLEM — Z87.09 HISTORY OF ASTHMA: Status: ACTIVE | Noted: 2021-02-06

## 2021-02-06 PROBLEM — R00.0 SINUS TACHYCARDIA: Status: ACTIVE | Noted: 2021-02-06

## 2021-02-06 PROBLEM — I10 ESSENTIAL HYPERTENSION: Status: ACTIVE | Noted: 2021-02-06

## 2021-02-06 NOTE — PATIENT INSTRUCTIONS
DASH Diet: Care Instructions Your Care Instructions The DASH diet is an eating plan that can help lower your blood pressure. DASH stands for Dietary Approaches to Stop Hypertension. Hypertension is high blood pressure. The DASH diet focuses on eating foods that are high in calcium, potassium, and magnesium. These nutrients can lower blood pressure. The foods that are highest in these nutrients are fruits, vegetables, low-fat dairy products, nuts, seeds, and legumes. But taking calcium, potassium, and magnesium supplements instead of eating foods that are high in those nutrients does not have the same effect. The DASH diet also includes whole grains, fish, and poultry. The DASH diet is one of several lifestyle changes your doctor may recommend to lower your high blood pressure. Your doctor may also want you to decrease the amount of sodium in your diet. Lowering sodium while following the DASH diet can lower blood pressure even further than just the DASH diet alone. Follow-up care is a key part of your treatment and safety. Be sure to make and go to all appointments, and call your doctor if you are having problems. It's also a good idea to know your test results and keep a list of the medicines you take. How can you care for yourself at home? Following the DASH diet · Eat 4 to 5 servings of fruit each day. A serving is 1 medium-sized piece of fruit, ½ cup chopped or canned fruit, 1/4 cup dried fruit, or 4 ounces (½ cup) of fruit juice. Choose fruit more often than fruit juice. · Eat 4 to 5 servings of vegetables each day. A serving is 1 cup of lettuce or raw leafy vegetables, ½ cup of chopped or cooked vegetables, or 4 ounces (½ cup) of vegetable juice. Choose vegetables more often than vegetable juice. · Get 2 to 3 servings of low-fat and fat-free dairy each day. A serving is 8 ounces of milk, 1 cup of yogurt, or 1 ½ ounces of cheese. · Eat 6 to 8 servings of grains each day. A serving is 1 slice of bread, 1 ounce of dry cereal, or ½ cup of cooked rice, pasta, or cooked cereal. Try to choose whole-grain products as much as possible. · Limit lean meat, poultry, and fish to 2 servings each day. A serving is 3 ounces, about the size of a deck of cards. · Eat 4 to 5 servings of nuts, seeds, and legumes (cooked dried beans, lentils, and split peas) each week. A serving is 1/3 cup of nuts, 2 tablespoons of seeds, or ½ cup of cooked beans or peas. · Limit fats and oils to 2 to 3 servings each day. A serving is 1 teaspoon of vegetable oil or 2 tablespoons of salad dressing. · Limit sweets and added sugars to 5 servings or less a week. A serving is 1 tablespoon jelly or jam, ½ cup sorbet, or 1 cup of lemonade. · Eat less than 2,300 milligrams (mg) of sodium a day. If you limit your sodium to 1,500 mg a day, you can lower your blood pressure even more. Tips for success · Start small. Do not try to make dramatic changes to your diet all at once. You might feel that you are missing out on your favorite foods and then be more likely to not follow the plan. Make small changes, and stick with them. Once those changes become habit, add a few more changes. · Try some of the following: ? Make it a goal to eat a fruit or vegetable at every meal and at snacks. This will make it easy to get the recommended amount of fruits and vegetables each day. ? Try yogurt topped with fruit and nuts for a snack or healthy dessert. ? Add lettuce, tomato, cucumber, and onion to sandwiches. ? Combine a ready-made pizza crust with low-fat mozzarella cheese and lots of vegetable toppings. Try using tomatoes, squash, spinach, broccoli, carrots, cauliflower, and onions. ? Have a variety of cut-up vegetables with a low-fat dip as an appetizer instead of chips and dip. ? Sprinkle sunflower seeds or chopped almonds over salads. Or try adding chopped walnuts or almonds to cooked vegetables. ? Try some vegetarian meals using beans and peas. Add garbanzo or kidney beans to salads. Make burritos and tacos with mashed rodriguez beans or black beans. Where can you learn more? Go to http://www.gray.com/ Enter R017 in the search box to learn more about \"DASH Diet: Care Instructions. \" Current as of: December 16, 2019               Content Version: 12.6 © 1773-1670 [employer]. Care instructions adapted under license by [employer] (which disclaims liability or warranty for this information). If you have questions about a medical condition or this instruction, always ask your healthcare professional. [address] any warranty or liability for your use of this information.

## 2021-05-17 DIAGNOSIS — I10 ESSENTIAL HYPERTENSION: ICD-10-CM

## 2021-05-19 RX ORDER — HYDROCHLOROTHIAZIDE 25 MG/1
TABLET ORAL
Qty: 90 TABLET | Refills: 1 | Status: SHIPPED | OUTPATIENT
Start: 2021-05-19 | End: 2022-02-07 | Stop reason: SDUPTHER

## 2021-05-24 DIAGNOSIS — I10 ESSENTIAL HYPERTENSION: ICD-10-CM

## 2021-05-24 NOTE — TELEPHONE ENCOUNTER
----- Message from [employer] sent at 5/24/2021  9:04 AM EDT -----  Regarding: Dr. Darrin Avelar  Medication Refill    Caller (if not patient):  Cony ayala       Relationship of caller (if not patient):daughter      Best contact number(s):[phone]      Name of medication and dosage if known: \"amLODIPine\"      Is patient out of this medication (yes/no):yes      Pharmacy name:[facility]    Pharmacy listed in chart? (yes/no):  Pharmacy phone number: [phone]        Details to clarify the request: stated that they have been trying to get this rx for the pt for about 2 weeks      [employer]

## 2021-05-24 NOTE — TELEPHONE ENCOUNTER
MD Suly Garcia,    Urgent request per note below. Has been requesting x 2 weeks per patient. Thanks, Yusuf Ortiz    Last Visit: 2/5/21 MD Suly Garcia  Next Appointment: Not scheduled  Previous Refill Encounter(s): 11/11/20 90 + 1    Requested Prescriptions     Pending Prescriptions Disp Refills    amLODIPine (NORVASC) 10 mg tablet 90 Tablet 1     Sig: Take 1 Tablet by mouth daily.

## 2021-05-26 RX ORDER — AMLODIPINE BESYLATE 10 MG/1
10 TABLET ORAL DAILY
Qty: 90 TABLET | Refills: 1 | Status: SHIPPED | OUTPATIENT
Start: 2021-05-26 | End: 2022-02-07 | Stop reason: SDUPTHER

## 2021-08-26 ENCOUNTER — OFFICE VISIT (OUTPATIENT)
Dept: PRIMARY CARE CLINIC | Age: 66
End: 2021-08-26
Payer: COMMERCIAL

## 2021-08-26 VITALS
BODY MASS INDEX: 31.73 KG/M2 | DIASTOLIC BLOOD PRESSURE: 73 MMHG | HEIGHT: 59 IN | RESPIRATION RATE: 18 BRPM | HEART RATE: 101 BPM | SYSTOLIC BLOOD PRESSURE: 127 MMHG | WEIGHT: 157.4 LBS | OXYGEN SATURATION: 100 % | TEMPERATURE: 98.2 F

## 2021-08-26 DIAGNOSIS — L30.9 ECZEMA, UNSPECIFIED TYPE: ICD-10-CM

## 2021-08-26 DIAGNOSIS — R00.0 SINUS TACHYCARDIA: ICD-10-CM

## 2021-08-26 DIAGNOSIS — J45.20 MILD INTERMITTENT ASTHMA WITHOUT COMPLICATION: ICD-10-CM

## 2021-08-26 DIAGNOSIS — E78.5 HYPERLIPIDEMIA LDL GOAL <100: ICD-10-CM

## 2021-08-26 DIAGNOSIS — I10 ESSENTIAL HYPERTENSION: Primary | ICD-10-CM

## 2021-08-26 LAB
ALBUMIN SERPL-MCNC: 3.7 G/DL (ref 3.5–5)
ALBUMIN/GLOB SERPL: 0.8 {RATIO} (ref 1.1–2.2)
ALP SERPL-CCNC: 133 U/L (ref 45–117)
ALT SERPL-CCNC: 17 U/L (ref 12–78)
ANION GAP SERPL CALC-SCNC: 6 MMOL/L (ref 5–15)
AST SERPL-CCNC: 14 U/L (ref 15–37)
BILIRUB SERPL-MCNC: 0.4 MG/DL (ref 0.2–1)
BUN SERPL-MCNC: 13 MG/DL (ref 6–20)
BUN/CREAT SERPL: 21 (ref 12–20)
CALCIUM SERPL-MCNC: 10.1 MG/DL (ref 8.5–10.1)
CHLORIDE SERPL-SCNC: 102 MMOL/L (ref 97–108)
CHOLEST SERPL-MCNC: 263 MG/DL
CO2 SERPL-SCNC: 31 MMOL/L (ref 21–32)
CREAT SERPL-MCNC: 0.63 MG/DL (ref 0.55–1.02)
GLOBULIN SER CALC-MCNC: 4.4 G/DL (ref 2–4)
GLUCOSE SERPL-MCNC: 120 MG/DL (ref 65–100)
HDLC SERPL-MCNC: 87 MG/DL
HDLC SERPL: 3 {RATIO} (ref 0–5)
LDLC SERPL CALC-MCNC: 165.6 MG/DL (ref 0–100)
POTASSIUM SERPL-SCNC: 3.6 MMOL/L (ref 3.5–5.1)
PROT SERPL-MCNC: 8.1 G/DL (ref 6.4–8.2)
SODIUM SERPL-SCNC: 139 MMOL/L (ref 136–145)
TRIGL SERPL-MCNC: 52 MG/DL (ref ?–150)
VLDLC SERPL CALC-MCNC: 10.4 MG/DL

## 2021-08-26 PROCEDURE — 99204 OFFICE O/P NEW MOD 45 MIN: CPT | Performed by: FAMILY MEDICINE

## 2021-08-26 RX ORDER — TRIAMCINOLONE ACETONIDE 5 MG/G
CREAM TOPICAL 2 TIMES DAILY
Qty: 15 G | Refills: 2 | Status: SHIPPED | OUTPATIENT
Start: 2021-08-26

## 2021-08-26 NOTE — PROGRESS NOTES
Subjective:     Chief Complaint   Patient presents with   [address]        She  is a 76y.o. year old female who presents as a new patient. She is here with her Son. Her medical hx is significant for HTN, HLD, mild asthma,  eczema. HTN: BP well controlled with Amlodipine and HCTZ. She reports that she was provided metoprolol for short of period of time for tachycardia by her last PCP however she is not taking that med any more. EKG was normal except sinus tachycardia. She denies any palpitation, CP, soa,. HLD: She is compliant with Lipitor 10 mg. No side effects. Mild asthma: Well controlled without any med. Patient has dry, itchy skin in her right leg, hands. Has been using daily moisturizer but seems like its not working. Records reviewed in chart. Lab Results   Component Value Date/Time    Cholesterol, total 240 (H) 10/19/2020 12:00 AM    HDL Cholesterol 84 10/19/2020 12:00 AM    LDL, calculated 150 (H) 10/19/2020 12:00 AM    VLDL, calculated 6 10/19/2020 12:00 AM    Triglyceride 41 10/19/2020 12:00 AM         A comprehensive review of systems was negative except for that written in the HPI.   Objective:     Vitals:    08/26/21 1537   BP: 127/73   Pulse: (!) 101   Resp: 18   Temp: 98.2 °F (36.8 °C)   TempSrc: Oral   SpO2: 100%   Weight: 157 lb 6.4 oz (71.4 kg)   Height: 4' 11\" (1.499 m)       Physical Examination: General appearance - alert, well appearing, and in no distress, oriented to person, place, and time and overweight  Mental status - alert, oriented to person, place, and time, normal mood, behavior, speech, dress, motor activity, and thought processes  Ears - bilateral TM's and external ear canals normal  Nose - normal and patent, no erythema, discharge or polyps  Mouth - mucous membranes moist, pharynx normal without lesions  Neck - supple, no significant adenopathy, thyroid exam: thyroid is normal in size without nodules or tenderness  Chest - clear to auscultation, no wheezes, rales or rhonchi, symmetric air entry  Heart - normal rate, regular rhythm, normal S1, S2, no murmurs, rubs, clicks or gallops  Abdomen - soft, nontender, nondistended, no masses or organomegaly  Neurological - alert, oriented, normal speech, no focal findings or movement disorder noted  Extremities - no pedal edema noted  Skin - dry patchy area in her right lower leg, dorsum of her both hands, flexor surface of arms. No Known Allergies   Social History     Socioeconomic History    Marital status:      Spouse name: Not on file    Number of children: Not on file    Years of education: Not on file    Highest education level: Not on file   Tobacco Use    Smoking status: Never Smoker    Smokeless tobacco: Never Used   Vaping Use    Vaping Use: Never used   Substance and Sexual Activity    Alcohol use: Never    Drug use: Never    Sexual activity: Never     Social Determinants of Health     Financial Resource Strain:     Difficulty of Paying Living Expenses:    Food Insecurity:     Worried About Running Out of Food in the Last Year:     [address] in the Last Year:    Transportation Needs:     Lack of Transportation (Medical):  Lack of Transportation (Non-Medical):    Physical Activity:     Days of Exercise per Week:     Minutes of Exercise per Session:    Stress:     Feeling of Stress :    Social Connections:     Frequency of Communication with Friends and Family:     Frequency of Social Gatherings with Friends and Family:     Attends [employer] Services:     Active Member of Clubs or Organizations:     Attends Club or Organization Meetings:     Marital Status:       History reviewed. No pertinent family history. Past Surgical History:   Procedure Laterality Date    HX HYSTERECTOMY  2019?     HX HYSTERECTOMY        Past Medical History:   Diagnosis Date    Hypertension     Menopause     LMP-35years old      Current Outpatient Medications   Medication Sig Dispense Refill    amLODIPine (NORVASC) 10 mg tablet Take 1 Tablet by mouth daily. 90 Tablet 1    hydroCHLOROthiazide (HYDRODIURIL) 25 mg tablet TAKE 1 TABLET BY MOUTH EVERY DAY 90 Tablet 1    cod liver oil cap Take 1 Cap by mouth daily. Once a day      albuterol (PROVENTIL HFA, VENTOLIN HFA, PROAIR HFA) 90 mcg/actuation inhaler Take 1 Puff by inhalation every four (4) hours as needed for Wheezing or Shortness of Breath. 1 Inhaler 5    metoprolol succinate (TOPROL-XL) 25 mg XL tablet TAKE 1 TABLET BY MOUTH EVERY DAY 30 Tab 0    atorvastatin (LIPITOR) 10 mg tablet Take 1 Tab by mouth daily. 90 Tab 3    ascorbic acid, vitamin C, (Vitamin C) 1,000 mg tablet Take  by mouth.  cholecalciferol, vitamin d3, (Vitamin D3) 10 mcg (400 unit) cap Take  by mouth.  mv,Ca,min/iron/FA/guarana/caff (ONE-A-DAY WOMEN'S ACTIVE PO) Take  by mouth.  inulin (FIBER GUMMIES PO) Take  by mouth.  docosahexaenoic acid/epa (FISH OIL PO) Take  by mouth.  zinc 50 mg tab tablet Take  by mouth daily. Assessment/ Plan:   Diagnoses and all orders for this visit:    1. Essential hypertension  -    BP well controlled with current medications. No refills needed at this time. METABOLIC PANEL, COMPREHENSIVE; Future    2. Hyperlipidemia LDL goal <295  -     METABOLIC PANEL, COMPREHENSIVE; Future  -     LIPID PANEL; Future              Will notify if any change needed otherwise continue current meds. 3. Eczema, unspecified type  -   Start   triamcinolone (ARISTOCORT) 0.5 % topical cream; Apply  to affected area two (2) times a day. use thin layer as needed    4. Sinus tachycardia       HR is slightly elevated. Pt is asymptomatic. Continue monitor. F/u if needed. 5. Mild intermittent asthma without complication      Stable without any medication. Medication risks/benefits/costs/interactions/alternatives discussed with patient.   Advised patient to call back or return to office if symptoms worsen/change/persist. If patient cannot reach us or should anything more severe/urgent arise he/she should proceed directly to the nearest emergency department. Discussed expected course/resolution/complications of diagnosis in detail with patient. Patient given a written after visit summary which includes her diagnoses, current medications and vitals. Patient expressed understanding with the diagnosis and plan. Follow-up and Dispositions    · Return in about 3 months (around 11/26/2021), or if symptoms worsen or fail to improve.

## 2021-08-26 NOTE — PROGRESS NOTES
Chief Complaint   Patient presents with   [address] Maintenance Due   Topic    DTaP/Tdap/Td series (1 - Tdap)    Colorectal Cancer Screening Combo     Shingrix Vaccine Age 50> (1 of 2)    Bone Densitometry (Dexa) Screening     Pneumococcal 65+ years (1 of 1 - PPSV23)        1. Have you been to the ER, urgent care clinic since your last visit? Hospitalized since your last visit? No    2. Have you seen or consulted any other health care providers outside of the [address] [place] [address] since your last visit? Include any pap smears or colon screening.  No    Visit Vitals  /73 (BP 1 Location: Right arm, BP Patient Position: Sitting, BP Cuff Size: Adult long)   Pulse (!) 101   Temp 98.2 °F (36.8 °C) (Oral)   Resp 18   Ht 4' 11\" (1.499 m)   Wt 157 lb 6.4 oz (71.4 kg)   SpO2 100%   BMI 31.79 kg/m²

## 2021-08-27 ENCOUNTER — TELEPHONE (OUTPATIENT)
Dept: PRIMARY CARE CLINIC | Age: 66
End: 2021-08-27

## 2021-08-27 NOTE — TELEPHONE ENCOUNTER
----- Message from Libby Mcburney, MD sent at 8/26/2021  9:31 PM EDT -----  Please call patient:    Cholesterol level went up from last time. According to cardiovascular risk factor she is at higher risk of CVD events. Please start taking Lipitor as prescribed and follow up in three months. Prescription sent to pharmacy. Kidney and liver function is stable.

## 2021-08-27 NOTE — PROGRESS NOTES
Please call patient:    Cholesterol level went up from last time. According to cardiovascular risk factor she is at higher risk of CVD events. Please start taking Lipitor as prescribed and follow up in three months. Prescription sent to pharmacy. Kidney and liver function is stable.

## 2021-08-27 NOTE — TELEPHONE ENCOUNTER
Spoke with patient's daughter to discuss lab results and inform to continue taking lipitor 10 mg as prescribed.

## 2021-08-29 PROBLEM — L30.9 ECZEMA: Status: ACTIVE | Noted: 2021-08-29

## 2021-08-29 PROBLEM — E78.5 HYPERLIPIDEMIA LDL GOAL <100: Status: ACTIVE | Noted: 2021-08-29

## 2021-08-29 PROBLEM — J45.20 MILD INTERMITTENT ASTHMA WITHOUT COMPLICATION: Status: ACTIVE | Noted: 2021-08-29

## 2022-02-07 ENCOUNTER — OFFICE VISIT (OUTPATIENT)
Dept: PRIMARY CARE CLINIC | Age: 67
End: 2022-02-07
Payer: COMMERCIAL

## 2022-02-07 ENCOUNTER — TELEPHONE (OUTPATIENT)
Dept: PRIMARY CARE CLINIC | Age: 67
End: 2022-02-07

## 2022-02-07 VITALS
SYSTOLIC BLOOD PRESSURE: 140 MMHG | RESPIRATION RATE: 18 BRPM | WEIGHT: 164 LBS | DIASTOLIC BLOOD PRESSURE: 100 MMHG | HEIGHT: 59 IN | OXYGEN SATURATION: 99 % | BODY MASS INDEX: 33.06 KG/M2 | TEMPERATURE: 97.6 F | HEART RATE: 125 BPM

## 2022-02-07 DIAGNOSIS — I10 ESSENTIAL HYPERTENSION: Primary | ICD-10-CM

## 2022-02-07 DIAGNOSIS — I10 ESSENTIAL HYPERTENSION: ICD-10-CM

## 2022-02-07 DIAGNOSIS — Z12.31 ENCOUNTER FOR SCREENING MAMMOGRAM FOR MALIGNANT NEOPLASM OF BREAST: ICD-10-CM

## 2022-02-07 DIAGNOSIS — R00.0 SINUS TACHYCARDIA: ICD-10-CM

## 2022-02-07 DIAGNOSIS — Z23 ENCOUNTER FOR IMMUNIZATION: ICD-10-CM

## 2022-02-07 DIAGNOSIS — Z87.09 HISTORY OF ASTHMA: ICD-10-CM

## 2022-02-07 DIAGNOSIS — E78.5 HYPERLIPIDEMIA LDL GOAL <100: ICD-10-CM

## 2022-02-07 DIAGNOSIS — Z78.0 POSTMENOPAUSAL: ICD-10-CM

## 2022-02-07 DIAGNOSIS — Z00.00 WELL ADULT ON ROUTINE HEALTH CHECK: Primary | ICD-10-CM

## 2022-02-07 PROCEDURE — 90732 PPSV23 VACC 2 YRS+ SUBQ/IM: CPT | Performed by: FAMILY MEDICINE

## 2022-02-07 PROCEDURE — 99213 OFFICE O/P EST LOW 20 MIN: CPT | Performed by: FAMILY MEDICINE

## 2022-02-07 PROCEDURE — 99397 PER PM REEVAL EST PAT 65+ YR: CPT | Performed by: FAMILY MEDICINE

## 2022-02-07 RX ORDER — CARVEDILOL PHOSPHATE 10 MG/1
10 CAPSULE, EXTENDED RELEASE ORAL
Qty: 90 CAPSULE | Refills: 0 | Status: SHIPPED | OUTPATIENT
Start: 2022-02-07 | End: 2022-02-08

## 2022-02-07 RX ORDER — ALBUTEROL SULFATE 90 UG/1
1 AEROSOL, METERED RESPIRATORY (INHALATION)
Qty: 1 EACH | Refills: 1 | Status: SHIPPED | OUTPATIENT
Start: 2022-02-07

## 2022-02-07 RX ORDER — AMLODIPINE BESYLATE 10 MG/1
10 TABLET ORAL DAILY
Qty: 90 TABLET | Refills: 1 | Status: SHIPPED | OUTPATIENT
Start: 2022-02-07 | End: 2022-06-17 | Stop reason: SDUPTHER

## 2022-02-07 RX ORDER — HYDROCHLOROTHIAZIDE 25 MG/1
25 TABLET ORAL DAILY
Qty: 90 TABLET | Refills: 1 | Status: SHIPPED | OUTPATIENT
Start: 2022-02-07 | End: 2022-06-17 | Stop reason: SDUPTHER

## 2022-02-07 NOTE — TELEPHONE ENCOUNTER
Per fax from [facility] pharmacy/Alt requested    Comments\" prescribed med not cov by ins.  Please consider changing to alternative  (carvedilol 3.125 mg tablet        carvedilol (COREG CR) 10 mg CR capsule

## 2022-02-07 NOTE — PROGRESS NOTES
Subjective:   78 y.o. female for Well Woman Check. No LMP recorded. Patient has had a hysterectomy. She  is a 78y.o. year old F with medical hx is significant for HTN, HLD, mild asthma,  eczema is here with her daughter for follow up chronic condition as well as blood work. Colonoscopy done within past 10 years at [place].      HTN: BP well controlled at home  with Amlodipine and HCTZ. BP usually gets high when she is at [facility] office due to nervousness she states. She was started on Metoprolol in the past for tachycardia but she could not tolerate so she stopped taking the med. She denies any palpitation, CP, soa. EKG was fine in the past.      HLD: She is compliant with Lipitor 10 mg. No side effects.     Mild asthma: Well controlled without any med. She would like to get Albuterol refill incase she needs.     Patient Active Problem List   Diagnosis Code    History of asthma Z87.09    Sinus tachycardia R00.0    Essential hypertension I10    Mild intermittent asthma without complication J11.89    Eczema L30.9    Hyperlipidemia LDL goal <100 E78.5     Current Outpatient Medications   Medication Sig Dispense Refill    albuterol (PROVENTIL HFA, VENTOLIN HFA, PROAIR HFA) 90 mcg/actuation inhaler Take 1 Puff by inhalation every six (6) hours as needed for Wheezing or Shortness of Breath. 1 Each 1    hydroCHLOROthiazide (HYDRODIURIL) 25 mg tablet Take 1 Tablet by mouth daily. 90 Tablet 1    amLODIPine (NORVASC) 10 mg tablet Take 1 Tablet by mouth daily. 90 Tablet 1    triamcinolone (ARISTOCORT) 0.5 % topical cream Apply  to affected area two (2) times a day. use thin layer as needed 15 g 2    cod liver oil cap Take 1 Cap by mouth daily. Once a day      atorvastatin (LIPITOR) 10 mg tablet Take 1 Tab by mouth daily. 90 Tab 3    ascorbic acid, vitamin C, (Vitamin C) 1,000 mg tablet Take  by mouth.  zinc 50 mg tab tablet Take  by mouth daily.       cholecalciferol, vitamin d3, (Vitamin D3) 10 mcg (400 unit) cap Take  by mouth.  mv,Ca,min/iron/FA/guarana/caff (ONE-A-DAY WOMEN'S ACTIVE PO) Take  by mouth.  inulin (FIBER GUMMIES PO) Take  by mouth.  docosahexaenoic acid/epa (FISH OIL PO) Take  by mouth.  metoprolol succinate (TOPROL-XL) 25 mg XL tablet TAKE 1 TABLET BY MOUTH EVERY DAY (Patient not taking: Reported on 8/26/2021) 30 Tab 0     No Known Allergies  Family History   Family history unknown: Yes     Social History     Tobacco Use    Smoking status: Never Smoker    Smokeless tobacco: Never Used   Substance Use Topics    Alcohol use: Never        ROS:  Feeling well. No dyspnea or chest pain on exertion. No abdominal pain, change in bowel habits, black or bloody stools. No urinary tract symptoms. GYN ROS: negative. No neurological complaints. Objective:     Visit Vitals  BP (!) 140/100 (BP 1 Location: Left upper arm, BP Patient Position: Sitting)   Pulse (!) 125   Temp 97.6 °F (36.4 °C) (Temporal)   Resp 18   Ht 4' 11\" (1.499 m)   Wt 164 lb (74.4 kg)   SpO2 99%   BMI 33.12 kg/m²     The patient appears well, alert, oriented x 3, in no distress. ENT normal.  Neck supple. No adenopathy or thyromegaly. CATINA. Lungs are clear, good air entry, no wheezes, rhonchi or rales. S1 and S2 normal, no murmurs, tachycardia with normal rhythm. Abdomen soft without tenderness, guarding, mass or organomegaly. Extremities show no edema, normal peripheral pulses. Neurological is normal, no focal findings. BREAST EXAM: not examined    PELVIC EXAM: examination not indicated    Assessment/Plan:   well woman  mammogram  counseled on mammography screening and adequate intake of calcium and vitamin D  additional lab tests per orders  return annually or prn    ICD-10-CM ICD-9-CM    1. Well adult on routine health check  Z00.00 V70.0 HEMOGLOBIN A1C WITH EAG      CBC WITH AUTOMATED DIFF      THYROID CASCADE PROFILE   2.  History of asthma  Z87.09 V12.69 albuterol (PROVENTIL HFA, VENTOLIN HFA, PROAIR HFA) 90 mcg/actuation inhaler   3. Encounter for screening mammogram for malignant neoplasm of breast  Z12.31 V76.12 [facility] 3D YOHANNES W MAMMO BI SCREENING INCL CAD   4. Postmenopausal  Z78.0 V49.81 DEXA BONE DENSITY STUDY AXIAL   5. Encounter for immunization  Z23 V03.89 PNEUMOCOCCAL POLYSACCHARIDE VACCINE, 23-VALENT, ADULT OR IMMUNOSUPPRESSED PT DOSE,   6. Hyperlipidemia LDL goal <100  Z52.5 423.4 METABOLIC PANEL, COMPREHENSIVE      LIPID PANEL   7. Essential hypertension  X73 272.5 METABOLIC PANEL, COMPREHENSIVE      hydroCHLOROthiazide (HYDRODIURIL) 25 mg tablet      amLODIPine (NORVASC) 10 mg tablet      carvedilol (COREG CR) 10 mg CR capsule   8. Sinus tachycardia  R00.0 427.89 carvedilol (COREG CR) 10 mg CR capsule     Diagnoses and all orders for this visit:    1. Well adult on routine health check  -     HEMOGLOBIN A1C WITH EAG; Future  -     CBC WITH AUTOMATED DIFF; Future  -     THYROID CASCADE PROFILE; Future    2. Essential hypertension  -     METABOLIC PANEL, COMPREHENSIVE; Future  -     hydroCHLOROthiazide (HYDRODIURIL) 25 mg tablet; Take 1 Tablet by mouth daily. -     amLODIPine (NORVASC) 10 mg tablet; Take 1 Tablet by mouth daily.  -     Start carvedilol (COREG CR) 10 mg CR capsule; Take 1 Capsule by mouth daily (with breakfast). 3. Hyperlipidemia LDL goal <268  -     METABOLIC PANEL, COMPREHENSIVE; Future  -     LIPID PANEL; Future    4. Sinus tachycardia  -     She is asymptomatic. Start  carvedilol (COREG CR) 10 mg CR capsule; Take 1 Capsule by mouth daily (with breakfast). Consider sending to cardiologist if needed. 5. History of asthma  -     albuterol (PROVENTIL HFA, VENTOLIN HFA, PROAIR HFA) 90 mcg/actuation inhaler; Take 1 Puff by inhalation every six (6) hours as needed for Wheezing or Shortness of Breath. 6. Encounter for screening mammogram for malignant neoplasm of breast  -     [facility] 3D YOHANNES W MAMMO BI SCREENING INCL CAD; Future    7.  Postmenopausal  -     DEXA BONE DENSITY STUDY AXIAL; Future    8. Encounter for immunization  -     PNEUMOCOCCAL POLYSACCHARIDE VACCINE, 23-VALENT, ADULT OR IMMUNOSUPPRESSED PT DOSE,    Advised to get shingle vaccine form local pharmacy. Follow-up and Dispositions    · Return in about 2 months (around 4/7/2022), or if symptoms worsen or fail to improve, for Hypertension, HR. .       reviewed diet, exercise and weight control  reviewed medications and side effects in detail.

## 2022-02-07 NOTE — PROGRESS NOTES
Identified pt with two pt identifiers(name and ). Chief Complaint   Patient presents with    Physical        3 most recent PHQ Screens 2022   Little interest or pleasure in doing things Not at all   Feeling down, depressed, irritable, or hopeless Not at all   Total Score PHQ 2 0        Vitals:    22 1149 22 1156   BP: (!) 148/80 (!) 158/76   Pulse: (!) 123 (!) 125   Resp: 18    Temp: 97.6 °F (36.4 °C)    TempSrc: Temporal    SpO2: 99%    Weight: 164 lb (74.4 kg)    Height: 4' 11\" (1.499 m)    PainSc:   0 - No pain        Health Maintenance Due   Topic    DTaP/Tdap/Td series (1 - Tdap)    Colorectal Cancer Screening Combo     Shingrix Vaccine Age 50> (1 of 2)    Bone Densitometry (Dexa) Screening     Pneumococcal 65+ years (1 of 1 - PPSV23)    Flu Vaccine (1)       1. Have you been to the ER, urgent care clinic since your last visit? Hospitalized since your last visit? No    2. Have you seen or consulted any other health care providers outside of the [address] [place] [address] since your last visit? Include any pap smears or colon screening.  No

## 2022-02-08 RX ORDER — CARVEDILOL 3.12 MG/1
3.12 TABLET ORAL 2 TIMES DAILY WITH MEALS
Qty: 60 TABLET | Refills: 1 | Status: SHIPPED | OUTPATIENT
Start: 2022-02-08 | End: 2022-06-17

## 2022-02-09 ENCOUNTER — TELEPHONE (OUTPATIENT)
Dept: PRIMARY CARE CLINIC | Age: 67
End: 2022-02-09

## 2022-02-10 DIAGNOSIS — E03.9 ACQUIRED HYPOTHYROIDISM: Primary | ICD-10-CM

## 2022-02-10 RX ORDER — LEVOTHYROXINE SODIUM 25 UG/1
25 TABLET ORAL
Qty: 90 TABLET | Refills: 0 | Status: SHIPPED | OUTPATIENT
Start: 2022-02-10 | End: 2022-06-14 | Stop reason: SDUPTHER

## 2022-02-11 NOTE — TELEPHONE ENCOUNTER
----- Message from Reymundo Gage MD sent at 2/10/2022 10:19 PM EST -----  Please call patient:      CBC, cholesterol level is normal.    Thyroid level is abnormal , in hypothyroid range. Start taking synthroid 25 mcg daily. Follow up in three months. Prescription sent to pharmacy.

## 2022-03-19 PROBLEM — E78.5 HYPERLIPIDEMIA LDL GOAL <100: Status: ACTIVE | Noted: 2021-08-29

## 2022-03-19 PROBLEM — L30.9 ECZEMA: Status: ACTIVE | Noted: 2021-08-29

## 2022-03-19 PROBLEM — R00.0 SINUS TACHYCARDIA: Status: ACTIVE | Noted: 2021-02-06

## 2022-03-19 PROBLEM — J45.20 MILD INTERMITTENT ASTHMA WITHOUT COMPLICATION: Status: ACTIVE | Noted: 2021-08-29

## 2022-03-20 PROBLEM — I10 ESSENTIAL HYPERTENSION: Status: ACTIVE | Noted: 2021-02-06

## 2022-03-20 PROBLEM — Z87.09 HISTORY OF ASTHMA: Status: ACTIVE | Noted: 2021-02-06

## 2022-06-14 DIAGNOSIS — E03.9 ACQUIRED HYPOTHYROIDISM: ICD-10-CM

## 2022-06-14 NOTE — TELEPHONE ENCOUNTER
PCP: Rachele Allison MD    Last appt: 2/7/2022  Future Appointments   Date Time Provider Aime Gallardo   6/17/2022 11:00 AM Tiffanie Huang DO [facility]       Requested Prescriptions     Pending Prescriptions Disp Refills    levothyroxine (SYNTHROID) 25 mcg tablet 90 Tablet 0     Sig: Take 1 Tablet by mouth Daily (before breakfast).          Other Comments:

## 2022-06-15 RX ORDER — LEVOTHYROXINE SODIUM 25 UG/1
25 TABLET ORAL
Qty: 30 TABLET | Refills: 0 | Status: SHIPPED | OUTPATIENT
Start: 2022-06-15 | End: 2022-06-17 | Stop reason: SDUPTHER

## 2022-06-17 ENCOUNTER — OFFICE VISIT (OUTPATIENT)
Dept: PRIMARY CARE CLINIC | Age: 67
End: 2022-06-17
Payer: COMMERCIAL

## 2022-06-17 VITALS
DIASTOLIC BLOOD PRESSURE: 80 MMHG | RESPIRATION RATE: 18 BRPM | WEIGHT: 170.8 LBS | TEMPERATURE: 98.6 F | OXYGEN SATURATION: 99 % | BODY MASS INDEX: 34.43 KG/M2 | HEART RATE: 118 BPM | HEIGHT: 59 IN | SYSTOLIC BLOOD PRESSURE: 147 MMHG

## 2022-06-17 DIAGNOSIS — R73.03 PREDIABETES: Primary | ICD-10-CM

## 2022-06-17 DIAGNOSIS — I10 ESSENTIAL HYPERTENSION: ICD-10-CM

## 2022-06-17 DIAGNOSIS — E78.5 HYPERLIPIDEMIA, UNSPECIFIED HYPERLIPIDEMIA TYPE: ICD-10-CM

## 2022-06-17 DIAGNOSIS — E03.9 ACQUIRED HYPOTHYROIDISM: ICD-10-CM

## 2022-06-17 DIAGNOSIS — H93.8X1 SENSATION OF FULLNESS IN RIGHT EAR: ICD-10-CM

## 2022-06-17 PROCEDURE — 1123F ACP DISCUSS/DSCN MKR DOCD: CPT | Performed by: FAMILY MEDICINE

## 2022-06-17 PROCEDURE — 99214 OFFICE O/P EST MOD 30 MIN: CPT | Performed by: FAMILY MEDICINE

## 2022-06-17 RX ORDER — HYDROCHLOROTHIAZIDE 25 MG/1
25 TABLET ORAL DAILY
Qty: 90 TABLET | Refills: 3 | Status: SHIPPED | OUTPATIENT
Start: 2022-06-17

## 2022-06-17 RX ORDER — LEVOTHYROXINE SODIUM 25 UG/1
25 TABLET ORAL
Qty: 30 TABLET | Refills: 0 | Status: SHIPPED | OUTPATIENT
Start: 2022-06-17 | End: 2022-08-15 | Stop reason: SDUPTHER

## 2022-06-17 RX ORDER — ATORVASTATIN CALCIUM 10 MG/1
10 TABLET, FILM COATED ORAL DAILY
Qty: 90 TABLET | Refills: 3 | Status: SHIPPED | OUTPATIENT
Start: 2022-06-17

## 2022-06-17 RX ORDER — AMLODIPINE BESYLATE 10 MG/1
10 TABLET ORAL DAILY
Qty: 90 TABLET | Refills: 3 | Status: SHIPPED | OUTPATIENT
Start: 2022-06-17

## 2022-06-17 NOTE — PROGRESS NOTES
Identified pt with two pt identifiers(name and ). Chief Complaint   Patient presents with    Hypertension    Thyroid Problem    Cholesterol Problem    Back Pain     started yesterday - denies urinary symptoms     Ear Pain     R ear feels full         3 most recent PHQ Screens 2022   Little interest or pleasure in doing things Not at all   Feeling down, depressed, irritable, or hopeless Not at all   Total Score PHQ 2 0        Vitals:    22 1108 22 1115   BP: (!) 151/76 (!) 147/80   Pulse: (!) 122 (!) 118   Resp: 18    Temp: 98.6 °F (37 °C)    TempSrc: Temporal    SpO2: 99%    Weight: 170 lb 12.8 oz (77.5 kg)    Height: 4' 11\" (1.499 m)        Health Maintenance Due   Topic Date Due    DTaP/Tdap/Td series (1 - Tdap) Never done    Colorectal Cancer Screening Combo  Never done    Shingrix Vaccine Age 50> (1 of 2) Never done    Bone Densitometry (Dexa) Screening  Never done        1. Have you been to the ER, urgent care clinic since your last visit? Hospitalized since your last visit? No    2. Have you seen or consulted any other health care providers outside of the [address] [place] [address] since your last visit? Include any pap smears or colon screening. No    3. For patients aged 39-70: Has the patient had a colonoscopy / FIT/ Cologuard? Yes - Care Gap present. Rooming MA/LPN to request most recent results      If the patient is female:    4. For patients aged 41-77: Has the patient had a mammogram within the past 2 years? Yes - no Care Gap present      5. For patients aged 21-65: Has the patient had a pap smear?  Yes - no Care Gap present

## 2022-06-17 NOTE — PATIENT INSTRUCTIONS
Can use Flonase or Claritin as needed for allergies. Low Back Pain: Exercises  Introduction  Here are some examples of exercises for you to try. The exercises may be suggested for a condition or for rehabilitation. Start each exercise slowly. Ease off the exercises if you start to have pain. You will be told when to start these exercises and which ones will work best for you. How to do the exercises  Press-up    1. Lie on your stomach, supporting your body with your forearms. 2. Press your elbows down into the floor to raise your upper back. As you do this, relax your stomach muscles and allow your back to arch without using your back muscles. As your press up, do not let your hips or pelvis come off the floor. 3. Hold for 15 to 30 seconds, then relax. 4. Repeat 2 to 4 times. Alternate arm and leg (bird dog) exercise    Do this exercise slowly. Try to keep your body straight at all times, and do not let one hip drop lower than the other. 1. Start on the floor, on your hands and knees. 2. Tighten your belly muscles. 3. Raise one leg off the floor, and hold it straight out behind you. Be careful not to let your hip drop down, because that will twist your trunk. 4. Hold for about 6 seconds, then lower your leg and switch to the other leg. 5. Repeat 8 to 12 times on each leg. 6. Over time, work up to holding for 10 to 30 seconds each time. 7. If you feel stable and secure with your leg raised, try raising the opposite arm straight out in front of you at the same time. Knee-to-chest exercise    1. Lie on your back with your knees bent and your feet flat on the floor. 2. Bring one knee to your chest, keeping the other foot flat on the floor (or keeping the other leg straight, whichever feels better on your lower back). 3. Keep your lower back pressed to the floor. Hold for at least 15 to 30 seconds. 4. Relax, and lower the knee to the starting position. 5. Repeat with the other leg.  Repeat 2 to 4 times with each leg. 6. To get more stretch, put your other leg flat on the floor while pulling your knee to your chest.  Curl-ups    1. Lie on the floor on your back with your knees bent at a 90-degree angle. Your feet should be flat on the floor, about 12 inches from your buttocks. 2. Cross your arms over your chest. If this bothers your neck, try putting your hands behind your neck (not your head), with your elbows spread apart. 3. Slowly tighten your belly muscles and raise your shoulder blades off the floor. 4. Keep your head in line with your body, and do not press your chin to your chest.  5. Hold this position for 1 or 2 seconds, then slowly lower yourself back down to the floor. 6. Repeat 8 to 12 times. Pelvic tilt exercise    1. Lie on your back with your knees bent. 2. \"Brace\" your stomach. This means to tighten your muscles by pulling in and imagining your belly button moving toward your spine. You should feel like your back is pressing to the floor and your hips and pelvis are rocking back. 3. Hold for about 6 seconds while you breathe smoothly. 4. Repeat 8 to 12 times. Heel dig bridging    1. Lie on your back with both knees bent and your ankles bent so that only your heels are digging into the floor. Your knees should be bent about 90 degrees. 2. Then push your heels into the floor, squeeze your buttocks, and lift your hips off the floor until your shoulders, hips, and knees are all in a straight line. 3. Hold for about 6 seconds as you continue to breathe normally, and then slowly lower your hips back down to the floor and rest for up to 10 seconds. 4. Do 8 to 12 repetitions. Hamstring stretch in doorway    1. Lie on your back in a doorway, with one leg through the open door. 2. Slide your leg up the wall to straighten your knee. You should feel a gentle stretch down the back of your leg. 3. Hold the stretch for at least 15 to 30 seconds.  Do not arch your back, point your toes, or bend either knee. Keep one heel touching the floor and the other heel touching the wall. 4. Repeat with your other leg. 5. Do 2 to 4 times for each leg. Hip flexor stretch    1. Kneel on the floor with one knee bent and one leg behind you. Place your forward knee over your foot. Keep your other knee touching the floor. 2. Slowly push your hips forward until you feel a stretch in the upper thigh of your rear leg. 3. Hold the stretch for at least 15 to 30 seconds. Repeat with your other leg. 4. Do 2 to 4 times on each side. Wall sit    1. Stand with your back 10 to 12 inches away from a wall. 2. Lean into the wall until your back is flat against it. 3. Slowly slide down until your knees are slightly bent, pressing your lower back into the wall. 4. Hold for about 6 seconds, then slide back up the wall. 5. Repeat 8 to 12 times. Follow-up care is a key part of your treatment and safety. Be sure to make and go to all appointments, and call your doctor if you are having problems. It's also a good idea to know your test results and keep a list of the medicines you take. Where can you learn more? Go to http://www.gray.com/  Enter D041 in the search box to learn more about \"Low Back Pain: Exercises. \"  Current as of: July 1, 2021               Content Version: 13.2  © 2006-2022 [employer]. Care instructions adapted under license by [employer] (which disclaims liability or warranty for this information).  If you have questions about a medical condition or this instruction, always ask your healthcare professional. [place] [address] any warranty or liability for your use of this information.

## 2022-06-17 NOTE — PROGRESS NOTES
HPI     Chief Complaint   Patient presents with    Hypertension    Thyroid Problem    Cholesterol Problem    Back Pain     started yesterday - denies urinary symptoms     Ear Pain     R ear feels full      She is a 78 y.o. female who presents for HTN. Endorses low back and knee pain. x3 days. States it was painful last night. Comes and goes. No sciatica. No weakness. No pain with urination, no hematuria. No saddle anesthesia. Endorses full feeling in R ear. Would like her labs checked today. States she gets nervous coming to the doctor and that is why her HR and BP are high. States they are well controlled at home without BB so she has not been taking it. Denies any symptoms. Review of Systems   Constitutional: Negative for chills and fever. HENT: Negative for sore throat. Respiratory: Negative for cough. Reviewed PmHx, RxHx, FmHx, SocHx, AllgHx and updated and dated in the chart. Physical Exam:  Visit Vitals  BP (!) 147/80 (BP 1 Location: Left upper arm, BP Patient Position: Sitting, BP Cuff Size: Adult)   Pulse (!) 118   Temp 98.6 °F (37 °C) (Temporal)   Resp 18   Ht 4' 11\" (1.499 m)   Wt 170 lb 12.8 oz (77.5 kg)   SpO2 99%   BMI 34.50 kg/m²     Physical Exam  Vitals and nursing note reviewed. Constitutional:       Appearance: Normal appearance. HENT:      Right Ear: Tympanic membrane and ear canal normal.      Left Ear: Tympanic membrane and ear canal normal.   Cardiovascular:      Rate and Rhythm: Regular rhythm. Tachycardia present. Heart sounds: No murmur heard. Pulmonary:      Effort: Pulmonary effort is normal. No respiratory distress. Breath sounds: Normal breath sounds. Neurological:      General: No focal deficit present. Mental Status: She is alert. Psychiatric:         Mood and Affect: Mood normal.         Behavior: Behavior normal.       No results found for this or any previous visit (from the past 12 hour(s)).        Assessment / Plan     Diagnoses and all orders for this visit:    1. Prediabetes  -     HEMOGLOBIN A1C WITH EAG; Future    2. Acquired hypothyroidism  -     levothyroxine (SYNTHROID) 25 mcg tablet; Take 1 Tablet by mouth Daily (before breakfast). Needs labs for additional fills.  -     TSH 3RD GENERATION; Future    3. Essential hypertension  -     hydroCHLOROthiazide (HYDRODIURIL) 25 mg tablet; Take 1 Tablet by mouth daily. -     amLODIPine (NORVASC) 10 mg tablet; Take 1 Tablet by mouth daily. 4. Hyperlipidemia, unspecified hyperlipidemia type  -     atorvastatin (LIPITOR) 10 mg tablet; Take 1 Tablet by mouth daily. 5. Sensation of fullness in right ear       Suspect she may have allergies. Recommend Flonase 1 spray in each nostril daily which can be bought OTC. Continue to monitor BP and HR. I have discussed the diagnosis with the patient and the intended plan as seen in the above orders. The patient has received an after-visit summary and questions were answered concerning future plans. I have discussed medication side effects and warnings with the patient as well.     Ashley Tomlinson, DO

## 2023-01-26 ENCOUNTER — OFFICE VISIT (OUTPATIENT)
Dept: PRIMARY CARE CLINIC | Age: 68
End: 2023-01-26
Payer: COMMERCIAL

## 2023-01-26 VITALS
RESPIRATION RATE: 17 BRPM | OXYGEN SATURATION: 98 % | BODY MASS INDEX: 34.07 KG/M2 | HEART RATE: 90 BPM | TEMPERATURE: 97.6 F | WEIGHT: 169 LBS | SYSTOLIC BLOOD PRESSURE: 144 MMHG | HEIGHT: 59 IN | DIASTOLIC BLOOD PRESSURE: 66 MMHG

## 2023-01-26 DIAGNOSIS — R73.03 PREDIABETES: ICD-10-CM

## 2023-01-26 DIAGNOSIS — E03.9 ACQUIRED HYPOTHYROIDISM: ICD-10-CM

## 2023-01-26 DIAGNOSIS — I10 PRIMARY HYPERTENSION: Primary | ICD-10-CM

## 2023-01-26 DIAGNOSIS — E78.5 HYPERLIPIDEMIA, UNSPECIFIED HYPERLIPIDEMIA TYPE: ICD-10-CM

## 2023-01-26 PROCEDURE — 3077F SYST BP >= 140 MM HG: CPT | Performed by: FAMILY MEDICINE

## 2023-01-26 PROCEDURE — 99214 OFFICE O/P EST MOD 30 MIN: CPT | Performed by: FAMILY MEDICINE

## 2023-01-26 PROCEDURE — 3078F DIAST BP <80 MM HG: CPT | Performed by: FAMILY MEDICINE

## 2023-01-26 PROCEDURE — 1123F ACP DISCUSS/DSCN MKR DOCD: CPT | Performed by: FAMILY MEDICINE

## 2023-01-26 NOTE — PROGRESS NOTES
Chief Complaint   Patient presents with    Follow-up    Medication Refill     Medications have . Identified pt with two pt identifiers(name and ). Chief Complaint   Patient presents with    Follow-up    Medication Refill     Medications have . 3 most recent PHQ Screens 2022   Little interest or pleasure in doing things Not at all   Feeling down, depressed, irritable, or hopeless Not at all   Total Score PHQ 2 0        There were no vitals filed for this visit. Health Maintenance Due   Topic Date Due    DTaP/Tdap/Td series (1 - Tdap) Never done    Colorectal Cancer Screening Combo  Never done    Shingles Vaccine (1 of 2) Never done    Bone Densitometry (Dexa) Screening  Never done    COVID-19 Vaccine (4 - Booster for Moderna series) 2022    Flu Vaccine (1) 2022    Breast Cancer Screen Mammogram  2023    Lipid Screen  2023        1. Have you been to the ER, urgent care clinic since your last visit? Hospitalized since your last visit? No    2. Have you seen or consulted any other health care providers outside of the [address] [place] [address] since your last visit? Include any pap smears or colon screening. No    3. For patients aged 39-70: Has the patient had a colonoscopy / FIT/ Cologuard? Yes - no Care Gap present      If the patient is female:    4. For patients aged 41-77: Has the patient had a mammogram within the past 2 years? No      5. For patients aged 21-65: Has the patient had a pap smear?  NA - based on age or sex

## 2023-01-26 NOTE — PROGRESS NOTES
HPI     Chief Complaint   Patient presents with    Follow-up    Medication Refill     Medications have . She is a 75 y.o. female who presents for follow up. Patient arrived 15 min late. Was seen. Discussed we may not be able to get to all concerns and she should follow up if there are any further items she would like to discuss. Prediabetes - She is not on medication for this. Last A1c 6.1 in 2022. Has been watching what she is eating in terms of sugar and carbs. She is good with diet. HTN - On Norvasc 10mg daily, HCTZ 25mg daily. BP has been looking good at home. States she gets nervous in doctor's offices. HLD - On Lipitor 10mg daily. Hypothyroidism - Currently on Synthroid 25mcg daily. TSH WNL in 2022. Asthma - She has Albuterol inhaler PRN. Review of Systems   Eyes:  Negative for visual disturbance. Respiratory:  Negative for shortness of breath. Cardiovascular:  Negative for chest pain. Neurological:  Negative for headaches. Reviewed PmHx, RxHx, FmHx, SocHx, AllgHx and updated and dated in the chart. Physical Exam:  Visit Vitals  BP (!) 144/66   Pulse 90   Temp 97.6 °F (36.4 °C) (Temporal)   Resp 17   Ht 4' 11\" (1.499 m)   Wt 169 lb (76.7 kg)   SpO2 98%   BMI 34.13 kg/m²     Physical Exam  Vitals and nursing note reviewed. Constitutional:       General: She is not in acute distress. Appearance: Normal appearance. She is not ill-appearing. Cardiovascular:      Rate and Rhythm: Normal rate and regular rhythm. Heart sounds: No murmur heard. Pulmonary:      Effort: Pulmonary effort is normal. No respiratory distress. Breath sounds: Normal breath sounds. No wheezing, rhonchi or rales. Neurological:      General: No focal deficit present. Mental Status: She is alert.    Psychiatric:         Mood and Affect: Mood normal.         Behavior: Behavior normal.     No results found for this or any previous visit (from the past 12 hour(s)). Assessment / Plan     Diagnoses and all orders for this visit:    1. Primary hypertension - Stable at home per patient. Continue Norvasc, HCTZ. Check Cr and electrolytes on diuretic.   -     METABOLIC PANEL, COMPREHENSIVE; Future    2. Prediabetes - Diet controlled. Recheck A1c to reevaluate. Can consider medication to prevent progression to diabetes if number has increased. -     HEMOGLOBIN A1C WITH EAG; Future    3. Acquired hypothyroidism - Recheck TSH level.   -     TSH 3RD GENERATION; Future    4. Hyperlipidemia, unspecified hyperlipidemia type - Recheck lipid panel to evaluate how current dose is working.   -     LIPID PANEL; Future       I have discussed the diagnosis with the patient and the intended plan as seen in the above orders. The patient has received an after-visit summary and questions were answered concerning future plans. I have discussed medication side effects and warnings with the patient as well.     Martine Espana, DO

## 2023-01-27 LAB
ALBUMIN SERPL-MCNC: 3.5 G/DL (ref 3.5–5)
ALBUMIN/GLOB SERPL: 0.8 (ref 1.1–2.2)
ALP SERPL-CCNC: 156 U/L (ref 45–117)
ALT SERPL-CCNC: 21 U/L (ref 12–78)
ANION GAP SERPL CALC-SCNC: 6 MMOL/L (ref 5–15)
AST SERPL-CCNC: 18 U/L (ref 15–37)
BILIRUB SERPL-MCNC: 0.4 MG/DL (ref 0.2–1)
BUN SERPL-MCNC: 10 MG/DL (ref 6–20)
BUN/CREAT SERPL: 13 (ref 12–20)
CALCIUM SERPL-MCNC: 9.9 MG/DL (ref 8.5–10.1)
CHLORIDE SERPL-SCNC: 103 MMOL/L (ref 97–108)
CHOLEST SERPL-MCNC: 222 MG/DL
CO2 SERPL-SCNC: 31 MMOL/L (ref 21–32)
CREAT SERPL-MCNC: 0.79 MG/DL (ref 0.55–1.02)
EST. AVERAGE GLUCOSE BLD GHB EST-MCNC: 131 MG/DL
GLOBULIN SER CALC-MCNC: 4.3 G/DL (ref 2–4)
GLUCOSE SERPL-MCNC: 107 MG/DL (ref 65–100)
HBA1C MFR BLD: 6.2 % (ref 4–5.6)
HDLC SERPL-MCNC: 77 MG/DL
HDLC SERPL: 2.9 (ref 0–5)
LDLC SERPL CALC-MCNC: 129.4 MG/DL (ref 0–100)
POTASSIUM SERPL-SCNC: 4.1 MMOL/L (ref 3.5–5.1)
PROT SERPL-MCNC: 7.8 G/DL (ref 6.4–8.2)
SODIUM SERPL-SCNC: 140 MMOL/L (ref 136–145)
TRIGL SERPL-MCNC: 78 MG/DL (ref ?–150)
TSH SERPL DL<=0.05 MIU/L-ACNC: 5.12 UIU/ML (ref 0.36–3.74)
VLDLC SERPL CALC-MCNC: 15.6 MG/DL

## 2023-01-29 RX ORDER — LEVOTHYROXINE SODIUM 50 UG/1
50 TABLET ORAL
Qty: 90 TABLET | Refills: 1 | Status: SHIPPED | OUTPATIENT
Start: 2023-01-29

## 2023-06-18 DIAGNOSIS — I10 ESSENTIAL (PRIMARY) HYPERTENSION: Primary | ICD-10-CM

## 2023-06-21 RX ORDER — HYDROCHLOROTHIAZIDE 25 MG/1
TABLET ORAL
Qty: 90 TABLET | Refills: 1 | Status: SHIPPED | OUTPATIENT
Start: 2023-06-21

## 2023-08-20 RX ORDER — LEVOTHYROXINE SODIUM 0.05 MG/1
TABLET ORAL
Qty: 30 TABLET | Refills: 0 | Status: SHIPPED | OUTPATIENT
Start: 2023-08-20 | End: 2023-10-09 | Stop reason: SDUPTHER

## 2023-09-26 RX ORDER — LEVOTHYROXINE SODIUM 0.05 MG/1
TABLET ORAL
Qty: 30 TABLET | Refills: 0 | OUTPATIENT
Start: 2023-09-26

## 2023-09-28 ENCOUNTER — TELEPHONE (OUTPATIENT)
Dept: PRIMARY CARE CLINIC | Facility: CLINIC | Age: 68
End: 2023-09-28

## 2023-09-28 NOTE — TELEPHONE ENCOUNTER
Amlodipine 10 mg  HCTZ 25 mg  Levothyroxine 50 mcg    [facility] [address]    Patient is aware that appt/labs are needed for levothyroxine. Patient has been scheduled for 10/12. Patient would appreciate a few pills to last until their appt since they are totally out.

## 2023-10-09 DIAGNOSIS — I10 ESSENTIAL (PRIMARY) HYPERTENSION: ICD-10-CM

## 2023-10-09 RX ORDER — LEVOTHYROXINE SODIUM 0.05 MG/1
50 TABLET ORAL
Qty: 3 TABLET | Refills: 0 | Status: SHIPPED | OUTPATIENT
Start: 2023-10-09 | End: 2023-10-12 | Stop reason: SDUPTHER

## 2023-10-09 RX ORDER — AMLODIPINE BESYLATE 10 MG/1
10 TABLET ORAL DAILY
Qty: 90 TABLET | Refills: 0 | Status: SHIPPED | OUTPATIENT
Start: 2023-10-09

## 2023-10-09 RX ORDER — HYDROCHLOROTHIAZIDE 25 MG/1
25 TABLET ORAL DAILY
Qty: 90 TABLET | Refills: 0 | Status: SHIPPED | OUTPATIENT
Start: 2023-10-09

## 2023-10-12 ENCOUNTER — OFFICE VISIT (OUTPATIENT)
Dept: PRIMARY CARE CLINIC | Facility: CLINIC | Age: 68
End: 2023-10-12
Payer: COMMERCIAL

## 2023-10-12 VITALS
HEART RATE: 91 BPM | RESPIRATION RATE: 17 BRPM | WEIGHT: 165 LBS | SYSTOLIC BLOOD PRESSURE: 120 MMHG | BODY MASS INDEX: 33.26 KG/M2 | OXYGEN SATURATION: 97 % | DIASTOLIC BLOOD PRESSURE: 78 MMHG | HEIGHT: 59 IN

## 2023-10-12 DIAGNOSIS — E78.5 HYPERLIPIDEMIA, UNSPECIFIED HYPERLIPIDEMIA TYPE: ICD-10-CM

## 2023-10-12 DIAGNOSIS — R73.03 PREDIABETES: Primary | ICD-10-CM

## 2023-10-12 DIAGNOSIS — Z78.0 POST-MENOPAUSAL: ICD-10-CM

## 2023-10-12 DIAGNOSIS — E03.9 HYPOTHYROIDISM, UNSPECIFIED TYPE: ICD-10-CM

## 2023-10-12 DIAGNOSIS — R73.03 PREDIABETES: ICD-10-CM

## 2023-10-12 DIAGNOSIS — J45.909 UNCOMPLICATED ASTHMA, UNSPECIFIED ASTHMA SEVERITY, UNSPECIFIED WHETHER PERSISTENT: ICD-10-CM

## 2023-10-12 DIAGNOSIS — Z23 ENCOUNTER FOR IMMUNIZATION: ICD-10-CM

## 2023-10-12 DIAGNOSIS — Z12.31 ENCOUNTER FOR SCREENING MAMMOGRAM FOR MALIGNANT NEOPLASM OF BREAST: ICD-10-CM

## 2023-10-12 DIAGNOSIS — I10 PRIMARY HYPERTENSION: ICD-10-CM

## 2023-10-12 LAB
ALBUMIN SERPL-MCNC: 3.5 G/DL (ref 3.5–5)
ALBUMIN/GLOB SERPL: 0.9 (ref 1.1–2.2)
ALP SERPL-CCNC: 144 U/L (ref 45–117)
ALT SERPL-CCNC: 19 U/L (ref 12–78)
ANION GAP SERPL CALC-SCNC: 3 MMOL/L (ref 5–15)
AST SERPL-CCNC: 18 U/L (ref 15–37)
BILIRUB SERPL-MCNC: 0.5 MG/DL (ref 0.2–1)
BUN SERPL-MCNC: 14 MG/DL (ref 6–20)
BUN/CREAT SERPL: 21 (ref 12–20)
CALCIUM SERPL-MCNC: 10.2 MG/DL (ref 8.5–10.1)
CHLORIDE SERPL-SCNC: 101 MMOL/L (ref 97–108)
CHOLEST SERPL-MCNC: 173 MG/DL
CO2 SERPL-SCNC: 33 MMOL/L (ref 21–32)
CREAT SERPL-MCNC: 0.66 MG/DL (ref 0.55–1.02)
ERYTHROCYTE [DISTWIDTH] IN BLOOD BY AUTOMATED COUNT: 13.2 % (ref 11.5–14.5)
EST. AVERAGE GLUCOSE BLD GHB EST-MCNC: 126 MG/DL
GLOBULIN SER CALC-MCNC: 4.1 G/DL (ref 2–4)
GLUCOSE SERPL-MCNC: 111 MG/DL (ref 65–100)
HBA1C MFR BLD: 6 % (ref 4–5.6)
HCT VFR BLD AUTO: 38.2 % (ref 35–47)
HDLC SERPL-MCNC: 81 MG/DL
HDLC SERPL: 2.1 (ref 0–5)
HGB BLD-MCNC: 12 G/DL (ref 11.5–16)
LDLC SERPL CALC-MCNC: 83.2 MG/DL (ref 0–100)
MCH RBC QN AUTO: 29.6 PG (ref 26–34)
MCHC RBC AUTO-ENTMCNC: 31.4 G/DL (ref 30–36.5)
MCV RBC AUTO: 94.3 FL (ref 80–99)
NRBC # BLD: 0 K/UL (ref 0–0.01)
NRBC BLD-RTO: 0 PER 100 WBC
PLATELET # BLD AUTO: 264 K/UL (ref 150–400)
PMV BLD AUTO: 10.5 FL (ref 8.9–12.9)
POTASSIUM SERPL-SCNC: 4 MMOL/L (ref 3.5–5.1)
PROT SERPL-MCNC: 7.6 G/DL (ref 6.4–8.2)
RBC # BLD AUTO: 4.05 M/UL (ref 3.8–5.2)
SODIUM SERPL-SCNC: 137 MMOL/L (ref 136–145)
TRIGL SERPL-MCNC: 44 MG/DL
TSH SERPL DL<=0.05 MIU/L-ACNC: 6.47 UIU/ML (ref 0.36–3.74)
VLDLC SERPL CALC-MCNC: 8.8 MG/DL
WBC # BLD AUTO: 6.1 K/UL (ref 3.6–11)

## 2023-10-12 PROCEDURE — 1123F ACP DISCUSS/DSCN MKR DOCD: CPT | Performed by: FAMILY MEDICINE

## 2023-10-12 PROCEDURE — 99214 OFFICE O/P EST MOD 30 MIN: CPT | Performed by: FAMILY MEDICINE

## 2023-10-12 PROCEDURE — 3074F SYST BP LT 130 MM HG: CPT | Performed by: FAMILY MEDICINE

## 2023-10-12 PROCEDURE — 90694 VACC AIIV4 NO PRSRV 0.5ML IM: CPT | Performed by: FAMILY MEDICINE

## 2023-10-12 PROCEDURE — 3078F DIAST BP <80 MM HG: CPT | Performed by: FAMILY MEDICINE

## 2023-10-12 PROCEDURE — 90471 IMMUNIZATION ADMIN: CPT | Performed by: FAMILY MEDICINE

## 2023-10-12 RX ORDER — LEVOTHYROXINE SODIUM 0.05 MG/1
50 TABLET ORAL
Qty: 30 TABLET | Refills: 0 | Status: SHIPPED | OUTPATIENT
Start: 2023-10-12 | End: 2023-10-15 | Stop reason: SDUPTHER

## 2023-10-12 RX ORDER — ALBUTEROL SULFATE 90 UG/1
1 AEROSOL, METERED RESPIRATORY (INHALATION) EVERY 4 HOURS PRN
Qty: 18 G | Refills: 5 | Status: SHIPPED | OUTPATIENT
Start: 2023-10-12

## 2023-10-12 RX ORDER — ATORVASTATIN CALCIUM 10 MG/1
10 TABLET, FILM COATED ORAL DAILY
Qty: 90 TABLET | Refills: 3 | Status: SHIPPED | OUTPATIENT
Start: 2023-10-12

## 2023-10-12 SDOH — ECONOMIC STABILITY: INCOME INSECURITY: HOW HARD IS IT FOR YOU TO PAY FOR THE VERY BASICS LIKE FOOD, HOUSING, MEDICAL CARE, AND HEATING?: PATIENT DECLINED

## 2023-10-12 SDOH — ECONOMIC STABILITY: FOOD INSECURITY: WITHIN THE PAST 12 MONTHS, YOU WORRIED THAT YOUR FOOD WOULD RUN OUT BEFORE YOU GOT MONEY TO BUY MORE.: PATIENT DECLINED

## 2023-10-12 SDOH — ECONOMIC STABILITY: HOUSING INSECURITY
IN THE LAST 12 MONTHS, WAS THERE A TIME WHEN YOU DID NOT HAVE A STEADY PLACE TO SLEEP OR SLEPT IN A SHELTER (INCLUDING NOW)?: PATIENT REFUSED

## 2023-10-12 SDOH — ECONOMIC STABILITY: FOOD INSECURITY: WITHIN THE PAST 12 MONTHS, THE FOOD YOU BOUGHT JUST DIDN'T LAST AND YOU DIDN'T HAVE MONEY TO GET MORE.: PATIENT DECLINED

## 2023-10-12 ASSESSMENT — PATIENT HEALTH QUESTIONNAIRE - PHQ9
SUM OF ALL RESPONSES TO PHQ9 QUESTIONS 1 & 2: 0
2. FEELING DOWN, DEPRESSED OR HOPELESS: 0
SUM OF ALL RESPONSES TO PHQ QUESTIONS 1-9: 0
1. LITTLE INTEREST OR PLEASURE IN DOING THINGS: 0
SUM OF ALL RESPONSES TO PHQ QUESTIONS 1-9: 0

## 2023-10-12 ASSESSMENT — ENCOUNTER SYMPTOMS: SHORTNESS OF BREATH: 0

## 2023-10-15 DIAGNOSIS — E03.9 HYPOTHYROIDISM, UNSPECIFIED TYPE: ICD-10-CM

## 2023-10-15 RX ORDER — LEVOTHYROXINE SODIUM 0.07 MG/1
75 TABLET ORAL
Qty: 90 TABLET | Refills: 1 | Status: SHIPPED | OUTPATIENT
Start: 2023-10-15

## 2023-11-17 ENCOUNTER — OFFICE VISIT (OUTPATIENT)
Dept: PRIMARY CARE CLINIC | Facility: CLINIC | Age: 68
End: 2023-11-17
Payer: COMMERCIAL

## 2023-11-17 VITALS
HEART RATE: 99 BPM | WEIGHT: 169 LBS | SYSTOLIC BLOOD PRESSURE: 144 MMHG | DIASTOLIC BLOOD PRESSURE: 74 MMHG | OXYGEN SATURATION: 99 % | BODY MASS INDEX: 34.07 KG/M2 | TEMPERATURE: 97.8 F | HEIGHT: 59 IN | RESPIRATION RATE: 18 BRPM

## 2023-11-17 DIAGNOSIS — I10 PRIMARY HYPERTENSION: ICD-10-CM

## 2023-11-17 DIAGNOSIS — R74.8 ELEVATED ALKALINE PHOSPHATASE LEVEL: ICD-10-CM

## 2023-11-17 DIAGNOSIS — E83.52 HYPERCALCEMIA: ICD-10-CM

## 2023-11-17 DIAGNOSIS — R74.8 ELEVATED ALKALINE PHOSPHATASE LEVEL: Primary | ICD-10-CM

## 2023-11-17 PROCEDURE — 1123F ACP DISCUSS/DSCN MKR DOCD: CPT | Performed by: FAMILY MEDICINE

## 2023-11-17 PROCEDURE — 3078F DIAST BP <80 MM HG: CPT | Performed by: FAMILY MEDICINE

## 2023-11-17 PROCEDURE — 99214 OFFICE O/P EST MOD 30 MIN: CPT | Performed by: FAMILY MEDICINE

## 2023-11-17 PROCEDURE — 3074F SYST BP LT 130 MM HG: CPT | Performed by: FAMILY MEDICINE

## 2023-11-18 LAB
25(OH)D3 SERPL-MCNC: 52.9 NG/ML (ref 30–100)
CALCIUM SERPL-MCNC: 10.1 MG/DL (ref 8.5–10.1)
GGT SERPL-CCNC: 30 U/L (ref 5–55)
PTH-INTACT SERPL-MCNC: 82.3 PG/ML (ref 18.4–88)

## 2023-11-19 ASSESSMENT — ENCOUNTER SYMPTOMS: ABDOMINAL PAIN: 0

## 2023-11-20 DIAGNOSIS — R74.8 ELEVATED ALKALINE PHOSPHATASE LEVEL: Primary | ICD-10-CM

## 2024-01-03 RX ORDER — AMLODIPINE BESYLATE 10 MG/1
10 TABLET ORAL DAILY
Qty: 90 TABLET | Refills: 2 | Status: SHIPPED | OUTPATIENT
Start: 2024-01-03

## 2024-02-16 ENCOUNTER — TELEMEDICINE (OUTPATIENT)
Dept: PRIMARY CARE CLINIC | Facility: CLINIC | Age: 69
End: 2024-02-16
Payer: COMMERCIAL

## 2024-02-16 DIAGNOSIS — E03.9 HYPOTHYROIDISM, UNSPECIFIED TYPE: ICD-10-CM

## 2024-02-16 DIAGNOSIS — I10 ESSENTIAL (PRIMARY) HYPERTENSION: ICD-10-CM

## 2024-02-16 PROCEDURE — 1123F ACP DISCUSS/DSCN MKR DOCD: CPT | Performed by: FAMILY MEDICINE

## 2024-02-16 PROCEDURE — 99214 OFFICE O/P EST MOD 30 MIN: CPT | Performed by: FAMILY MEDICINE

## 2024-02-16 RX ORDER — LEVOTHYROXINE SODIUM 0.07 MG/1
75 TABLET ORAL
Qty: 90 TABLET | Refills: 1 | Status: SHIPPED | OUTPATIENT
Start: 2024-02-16

## 2024-02-16 RX ORDER — HYDROCHLOROTHIAZIDE 25 MG/1
25 TABLET ORAL DAILY
Qty: 90 TABLET | Refills: 1 | Status: SHIPPED | OUTPATIENT
Start: 2024-02-16 | End: 2024-02-16

## 2024-02-16 RX ORDER — HYDROCHLOROTHIAZIDE 25 MG/1
25 TABLET ORAL DAILY
Qty: 90 TABLET | Refills: 1 | Status: SHIPPED | OUTPATIENT
Start: 2024-02-16

## 2024-02-16 RX ORDER — LEVOTHYROXINE SODIUM 0.07 MG/1
75 TABLET ORAL
Qty: 90 TABLET | Refills: 1 | Status: SHIPPED | OUTPATIENT
Start: 2024-02-16 | End: 2024-02-16

## 2024-02-16 NOTE — PROGRESS NOTES
Kalina Salomon (:  1955) is a Established patient, here for evaluation of the following:  Follow-up      Assessment & Plan   Below is the assessment and plan developed based on review of pertinent history, physical exam, labs, studies, and medications.  1. Hypothyroidism, unspecified type  -     TSH; Future  -     levothyroxine (SYNTHROID) 75 MCG tablet; Take 1 tablet by mouth every morning (before breakfast), Disp-90 tablet, R-1Normal  2. Essential (primary) hypertension  -     hydroCHLOROthiazide (HYDRODIURIL) 25 MG tablet; Take 1 tablet by mouth daily, Disp-90 tablet, R-1Normal    Needs to complete labs in system including alk phos isoenzymes and TSH.  Refilled BP med and thyroid medicine. Will adjust as needed.   Follow up in October for CPE.        Subjective   HPI    HTN - Currently on Norvasc 10mg daily and HCTZ 25mg daily.      Hypothyroidism - Currently on Levothyroxine 50mcg daily. TSH was high in 2023.      Prediabetes - Not on meds. Last A1c 6 in 2023.      Asthma - on Albuterol inhaler PRN.      HLD -  Currently on Lipitor 10mg daily. Lipids well controlled on 2023.     Have been working up elevated alk phos. Had normal GGT, PTH, Vit D and repeat calcium. Has not done alk phos isoenzyme test.       Patient Active Problem List   Diagnosis    Mild intermittent asthma without complication    Sinus tachycardia    Hyperlipidemia LDL goal <100    Eczema    History of asthma    Essential hypertension        Current Outpatient Medications on File Prior to Visit   Medication Sig Dispense Refill    amLODIPine (NORVASC) 10 MG tablet TAKE 1 TABLET BY MOUTH EVERY DAY 90 tablet 2    atorvastatin (LIPITOR) 10 MG tablet Take 1 tablet by mouth daily 90 tablet 3    albuterol sulfate HFA (PROVENTIL;VENTOLIN;PROAIR) 108 (90 Base) MCG/ACT inhaler Inhale 1 puff into the lungs every 4 hours as needed for Wheezing or Shortness of Breath 18 g 5    ascorbic acid (VITAMIN C) 1000 MG tablet Take by

## 2024-02-16 NOTE — PROGRESS NOTES
\"Have you been to the ER, urgent care clinic since your last visit?  Hospitalized since your last visit?\"    NO    “Have you seen or consulted any other health care providers outside of [facility] since your last visit?”    NO    “Have you had a colorectal cancer screening such as a colonoscopy/FIT/Cologuard?    NO     Have you had a mammogram?”   NO

## 2024-05-06 ENCOUNTER — COMMUNITY OUTREACH (OUTPATIENT)
Dept: PRIMARY CARE CLINIC | Facility: CLINIC | Age: 69
End: 2024-05-06

## 2024-05-10 DIAGNOSIS — E03.9 HYPOTHYROIDISM, UNSPECIFIED TYPE: ICD-10-CM

## 2024-05-10 LAB — TSH SERPL DL<=0.05 MIU/L-ACNC: 2.59 UIU/ML (ref 0.36–3.74)

## 2024-05-31 ENCOUNTER — TELEPHONE (OUTPATIENT)
Dept: PRIMARY CARE CLINIC | Facility: CLINIC | Age: 69
End: 2024-05-31

## 2024-05-31 NOTE — TELEPHONE ENCOUNTER
Spoke to patient daughter Patricia verified patients name and  to schedule her Mammogram patient confirmed 2024 1pm at [facility] patients appointment with Patricia and understand

## 2024-06-14 ENCOUNTER — HOSPITAL ENCOUNTER (OUTPATIENT)
Facility: HOSPITAL | Age: 69
End: 2024-06-14
Payer: COMMERCIAL

## 2024-06-14 VITALS — WEIGHT: 165 LBS | HEIGHT: 59 IN | BODY MASS INDEX: 33.26 KG/M2

## 2024-06-14 DIAGNOSIS — Z12.31 ENCOUNTER FOR SCREENING MAMMOGRAM FOR MALIGNANT NEOPLASM OF BREAST: ICD-10-CM

## 2024-06-14 PROCEDURE — 77063 BREAST TOMOSYNTHESIS BI: CPT

## 2024-10-07 DIAGNOSIS — I10 ESSENTIAL (PRIMARY) HYPERTENSION: ICD-10-CM

## 2024-10-07 RX ORDER — HYDROCHLOROTHIAZIDE 25 MG/1
25 TABLET ORAL DAILY
Qty: 90 TABLET | Refills: 1 | OUTPATIENT
Start: 2024-10-07

## 2024-10-07 RX ORDER — AMLODIPINE BESYLATE 10 MG/1
10 TABLET ORAL DAILY
Qty: 90 TABLET | Refills: 2 | OUTPATIENT
Start: 2024-10-07

## 2024-10-11 DIAGNOSIS — I10 ESSENTIAL (PRIMARY) HYPERTENSION: ICD-10-CM

## 2024-10-11 RX ORDER — HYDROCHLOROTHIAZIDE 25 MG/1
25 TABLET ORAL DAILY
Qty: 90 TABLET | Refills: 0 | Status: SHIPPED | OUTPATIENT
Start: 2024-10-11

## 2024-10-11 RX ORDER — AMLODIPINE BESYLATE 10 MG/1
10 TABLET ORAL DAILY
Qty: 90 TABLET | Refills: 0 | Status: SHIPPED | OUTPATIENT
Start: 2024-10-11

## 2024-10-11 NOTE — TELEPHONE ENCOUNTER
Patient is completely out of her amlodipine an HTCZ.  She thought she had an appointment next week but for some reason it is not scheduled like she thought.   I scheduled her for Dr Quiros first available in December but she is completely out of her blood pressure meds.

## 2024-11-27 RX ORDER — AMLODIPINE BESYLATE 10 MG/1
10 TABLET ORAL DAILY
Qty: 90 TABLET | Refills: 2 | OUTPATIENT
Start: 2024-11-27

## 2024-12-06 ENCOUNTER — CLINICAL DOCUMENTATION (OUTPATIENT)
Dept: PRIMARY CARE CLINIC | Facility: CLINIC | Age: 69
End: 2024-12-06

## 2024-12-06 NOTE — PROGRESS NOTES
Tried reaching patient to reschedule or cancel appointment due to PCP not in office on 12/10.    Left a voicemail to call back.    Offer appointment for 12/12 at 0830 for med refill

## 2024-12-10 ENCOUNTER — TELEPHONE (OUTPATIENT)
Dept: PRIMARY CARE CLINIC | Facility: CLINIC | Age: 69
End: 2024-12-10

## 2024-12-10 NOTE — TELEPHONE ENCOUNTER
Patients daughter called in and I apologized for the confusion with the appointment that the provider had to be out of the office today and we did try and call them. Daughther upset about the initial conversation this morning and I did advise I would reach out to our call center and have the call pulled for review. Made the patient an appointment for Thursday at 1130 for the med refill. Advised Patients daughter I would be following up with her.

## 2024-12-10 NOTE — TELEPHONE ENCOUNTER
Patients daughter called in asking about her mothers appointment today, which was canceled due to the provider being out.  I told her we called and left a message, offering another appointment, but they never called us back.  There was not an updated Permission to Release Form on file so I asked to speak to the patient.  I explained to the daughter that I could not speak with her and advised signing consents on Mychart.  This angered her and she would not stop talking, she was very mad, said she signed a form the last time she was in the clinic, in Dr Quiros office.  I told her I'm so sorry but they have to be renewed annually.  She asked my name and I gave it to her.  I also offered her the next available appointment, which also angered her.  She hung up.

## 2024-12-12 ENCOUNTER — HOSPITAL ENCOUNTER (OUTPATIENT)
Facility: HOSPITAL | Age: 69
Discharge: HOME | End: 2024-12-15
Payer: COMMERCIAL

## 2024-12-12 ENCOUNTER — OFFICE VISIT (OUTPATIENT)
Dept: PRIMARY CARE CLINIC | Facility: CLINIC | Age: 69
End: 2024-12-12

## 2024-12-12 VITALS
BODY MASS INDEX: 33.47 KG/M2 | HEART RATE: 86 BPM | DIASTOLIC BLOOD PRESSURE: 75 MMHG | OXYGEN SATURATION: 98 % | TEMPERATURE: 97.8 F | WEIGHT: 166 LBS | SYSTOLIC BLOOD PRESSURE: 124 MMHG | RESPIRATION RATE: 18 BRPM | HEIGHT: 59 IN

## 2024-12-12 DIAGNOSIS — E78.5 HYPERLIPIDEMIA, UNSPECIFIED HYPERLIPIDEMIA TYPE: ICD-10-CM

## 2024-12-12 DIAGNOSIS — M25.50 GENERALIZED JOINT PAIN: ICD-10-CM

## 2024-12-12 DIAGNOSIS — R05.3 PERSISTENT COUGH: ICD-10-CM

## 2024-12-12 DIAGNOSIS — R06.2 WHEEZING: ICD-10-CM

## 2024-12-12 DIAGNOSIS — E03.9 HYPOTHYROIDISM, UNSPECIFIED TYPE: ICD-10-CM

## 2024-12-12 DIAGNOSIS — J45.909 UNCOMPLICATED ASTHMA, UNSPECIFIED ASTHMA SEVERITY, UNSPECIFIED WHETHER PERSISTENT: ICD-10-CM

## 2024-12-12 DIAGNOSIS — R74.8 ELEVATED ALKALINE PHOSPHATASE LEVEL: ICD-10-CM

## 2024-12-12 DIAGNOSIS — R06.2 WHEEZING: Primary | ICD-10-CM

## 2024-12-12 DIAGNOSIS — I10 ESSENTIAL (PRIMARY) HYPERTENSION: ICD-10-CM

## 2024-12-12 PROCEDURE — 71046 X-RAY EXAM CHEST 2 VIEWS: CPT

## 2024-12-12 RX ORDER — PREDNISONE 10 MG/1
TABLET ORAL
Qty: 1 EACH | Refills: 0 | Status: SHIPPED | OUTPATIENT
Start: 2024-12-12

## 2024-12-12 RX ORDER — HYDROCHLOROTHIAZIDE 25 MG/1
25 TABLET ORAL DAILY
Qty: 90 TABLET | Refills: 0 | Status: SHIPPED | OUTPATIENT
Start: 2024-12-12

## 2024-12-12 RX ORDER — ATORVASTATIN CALCIUM 10 MG/1
10 TABLET, FILM COATED ORAL DAILY
Qty: 90 TABLET | Refills: 0 | Status: SHIPPED | OUTPATIENT
Start: 2024-12-12

## 2024-12-12 RX ORDER — BENZONATATE 100 MG/1
100 CAPSULE ORAL EVERY 8 HOURS PRN
Qty: 30 CAPSULE | Refills: 0 | Status: SHIPPED | OUTPATIENT
Start: 2024-12-12

## 2024-12-12 RX ORDER — AMLODIPINE BESYLATE 10 MG/1
10 TABLET ORAL DAILY
Qty: 90 TABLET | Refills: 0 | Status: SHIPPED | OUTPATIENT
Start: 2024-12-12

## 2024-12-12 RX ORDER — AZITHROMYCIN 250 MG/1
TABLET, FILM COATED ORAL
Qty: 6 TABLET | Refills: 0 | Status: SHIPPED | OUTPATIENT
Start: 2024-12-12 | End: 2024-12-22

## 2024-12-12 RX ORDER — LEVOTHYROXINE SODIUM 75 UG/1
75 TABLET ORAL
Qty: 90 TABLET | Refills: 1 | Status: SHIPPED | OUTPATIENT
Start: 2024-12-12

## 2024-12-12 RX ORDER — ALBUTEROL SULFATE 90 UG/1
1 INHALANT RESPIRATORY (INHALATION) EVERY 4 HOURS PRN
Qty: 18 G | Refills: 5 | Status: SHIPPED | OUTPATIENT
Start: 2024-12-12

## 2024-12-12 SDOH — ECONOMIC STABILITY: FOOD INSECURITY: WITHIN THE PAST 12 MONTHS, YOU WORRIED THAT YOUR FOOD WOULD RUN OUT BEFORE YOU GOT MONEY TO BUY MORE.: PATIENT DECLINED

## 2024-12-12 SDOH — ECONOMIC STABILITY: INCOME INSECURITY: HOW HARD IS IT FOR YOU TO PAY FOR THE VERY BASICS LIKE FOOD, HOUSING, MEDICAL CARE, AND HEATING?: PATIENT DECLINED

## 2024-12-12 SDOH — ECONOMIC STABILITY: FOOD INSECURITY: WITHIN THE PAST 12 MONTHS, THE FOOD YOU BOUGHT JUST DIDN'T LAST AND YOU DIDN'T HAVE MONEY TO GET MORE.: PATIENT DECLINED

## 2024-12-12 ASSESSMENT — PATIENT HEALTH QUESTIONNAIRE - PHQ9
1. LITTLE INTEREST OR PLEASURE IN DOING THINGS: NOT AT ALL
SUM OF ALL RESPONSES TO PHQ QUESTIONS 1-9: 0
SUM OF ALL RESPONSES TO PHQ9 QUESTIONS 1 & 2: 0
SUM OF ALL RESPONSES TO PHQ QUESTIONS 1-9: 0
SUM OF ALL RESPONSES TO PHQ QUESTIONS 1-9: 0
2. FEELING DOWN, DEPRESSED OR HOPELESS: NOT AT ALL
SUM OF ALL RESPONSES TO PHQ QUESTIONS 1-9: 0

## 2024-12-12 NOTE — PROGRESS NOTES
Health Decision Maker has been checked with the patient   Primary Decision Maker: Patricia Walden - [phone]     AI form was signed    Chief Complaint   Patient presents with    Medication Refill       \"Have you been to the ER, urgent care clinic since your last visit?  Hospitalized since your last visit?\"    NO    “Have you seen or consulted any other health care providers outside of [facility] since your last visit?”    NO      Vitals:    12/12/24 1150   Resp: 18   Temp: 97.8 °F (36.6 °C)   TempSrc: Oral   Weight: 75.3 kg (166 lb)   Height: 1.499 m (4' 11\")      Depression: Not at risk (12/12/2024)    PHQ-2     PHQ-2 Score: 0          “Have you had a colorectal cancer screening such as a colonoscopy/FIT/Cologuard?    YES - Type: Colonoscopy - Where: unknown Nurse/CMA to request most recent records if not in the chart     No colonoscopy on file  No cologuard on file  No FIT/FOBT on file   No flexible sigmoidoscopy on file         Click Here for Release of Records Request    Chart reviewed: immunizations are documented.   Immunization History   Administered Date(s) Administered    COVID-19, MODERNA BLUE border, Primary or Immunocompromised, (age 12y+), IM, 100 mcg/0.5mL 02/14/2021, 03/14/2021, 12/02/2021    Influenza, FLUAD, (age 65 y+), IM, Quadv, 0.5mL 10/12/2023    Influenza, FLUZONE High Dose (age 65 y+), IM, Quadv, 0.7mL 10/19/2020    Pneumococcal, PPSV23, PNEUMOVAX 23, (age 2y+), SC/IM, 0.5mL 02/07/2022      
diagnosis with the patient and the intended plan as seen in the above orders. The patient has received an after-visit summary and questions were answered concerning future plans.  I have discussed medication side effects and warnings with the patient as well.    Balaji Carr, DO

## 2024-12-13 DIAGNOSIS — E83.52 HYPERCALCEMIA: ICD-10-CM

## 2024-12-13 DIAGNOSIS — I51.7 CARDIOMEGALY: Primary | ICD-10-CM

## 2024-12-13 DIAGNOSIS — M25.50 GENERALIZED JOINT PAIN: Primary | ICD-10-CM

## 2024-12-13 DIAGNOSIS — R74.8 ELEVATED ALKALINE PHOSPHATASE LEVEL: ICD-10-CM

## 2024-12-13 LAB
ALBUMIN SERPL-MCNC: 4.3 G/DL (ref 3.9–4.9)
ALP SERPL-CCNC: 142 IU/L (ref 44–121)
ALT SERPL-CCNC: 18 IU/L (ref 0–32)
AST SERPL-CCNC: 21 IU/L (ref 0–40)
BASOPHILS # BLD AUTO: 0 X10E3/UL (ref 0–0.2)
BASOPHILS NFR BLD AUTO: 1 %
BILIRUB SERPL-MCNC: 0.3 MG/DL (ref 0–1.2)
BUN SERPL-MCNC: 13 MG/DL (ref 8–27)
BUN/CREAT SERPL: 19 (ref 12–28)
CALCIUM SERPL-MCNC: 10.4 MG/DL (ref 8.7–10.3)
CHLORIDE SERPL-SCNC: 98 MMOL/L (ref 96–106)
CHOLEST SERPL-MCNC: 257 MG/DL (ref 100–199)
CO2 SERPL-SCNC: 27 MMOL/L (ref 20–29)
CREAT SERPL-MCNC: 0.67 MG/DL (ref 0.57–1)
EGFRCR SERPLBLD CKD-EPI 2021: 95 ML/MIN/1.73
EOSINOPHIL # BLD AUTO: 0.1 X10E3/UL (ref 0–0.4)
EOSINOPHIL NFR BLD AUTO: 2 %
ERYTHROCYTE [DISTWIDTH] IN BLOOD BY AUTOMATED COUNT: 12.3 % (ref 11.7–15.4)
GLOBULIN SER CALC-MCNC: 3.7 G/DL (ref 1.5–4.5)
GLUCOSE SERPL-MCNC: 107 MG/DL (ref 70–99)
HCT VFR BLD AUTO: 40.8 % (ref 34–46.6)
HDLC SERPL-MCNC: 67 MG/DL
HGB BLD-MCNC: 13 G/DL (ref 11.1–15.9)
IMM GRANULOCYTES # BLD AUTO: 0 X10E3/UL (ref 0–0.1)
IMM GRANULOCYTES NFR BLD AUTO: 0 %
LDLC SERPL CALC-MCNC: 179 MG/DL (ref 0–99)
LYMPHOCYTES # BLD AUTO: 3.1 X10E3/UL (ref 0.7–3.1)
LYMPHOCYTES NFR BLD AUTO: 46 %
MCH RBC QN AUTO: 29.9 PG (ref 26.6–33)
MCHC RBC AUTO-ENTMCNC: 31.9 G/DL (ref 31.5–35.7)
MCV RBC AUTO: 94 FL (ref 79–97)
MONOCYTES # BLD AUTO: 0.7 X10E3/UL (ref 0.1–0.9)
MONOCYTES NFR BLD AUTO: 10 %
NEUTROPHILS # BLD AUTO: 2.7 X10E3/UL (ref 1.4–7)
NEUTROPHILS NFR BLD AUTO: 41 %
PLATELET # BLD AUTO: 377 X10E3/UL (ref 150–450)
POTASSIUM SERPL-SCNC: 4.4 MMOL/L (ref 3.5–5.2)
PROT SERPL-MCNC: 8 G/DL (ref 6–8.5)
RBC # BLD AUTO: 4.35 X10E6/UL (ref 3.77–5.28)
SODIUM SERPL-SCNC: 140 MMOL/L (ref 134–144)
TRIGL SERPL-MCNC: 68 MG/DL (ref 0–149)
TSH SERPL DL<=0.005 MIU/L-ACNC: 2.31 UIU/ML (ref 0.45–4.5)
VLDLC SERPL CALC-MCNC: 11 MG/DL (ref 5–40)
WBC # BLD AUTO: 6.6 X10E3/UL (ref 3.4–10.8)

## 2024-12-14 DIAGNOSIS — R76.8 RHEUMATOID FACTOR POSITIVE: Primary | ICD-10-CM

## 2024-12-14 DIAGNOSIS — M25.50 ARTHRALGIA, UNSPECIFIED JOINT: ICD-10-CM

## 2024-12-14 LAB
CCP IGA+IGG SERPL IA-ACNC: 9 UNITS (ref 0–19)
RHEUMATOID FACT SERPL-ACNC: 22.8 IU/ML

## 2024-12-16 LAB
ANA SER QL IF: POSITIVE
ANA SPECKLED TITR SER: NORMAL {TITER}
CENTROMERE B AB SER-ACNC: <0.2 AI (ref 0–0.9)
CHROMATIN AB SERPL-ACNC: <0.2 AI (ref 0–0.9)
DSDNA AB SER-ACNC: <1 IU/ML (ref 0–9)
ENA JO1 AB SER-ACNC: <0.2 AI (ref 0–0.9)
ENA RNP AB SER-ACNC: 0.5 AI (ref 0–0.9)
ENA SCL70 AB SER-ACNC: <0.2 AI (ref 0–0.9)
ENA SM AB SER-ACNC: <0.2 AI (ref 0–0.9)
ENA SS-A AB SER-ACNC: <0.2 AI (ref 0–0.9)
ENA SS-B AB SER-ACNC: <0.2 AI (ref 0–0.9)
LABORATORY COMMENT REPORT: ABNORMAL
Lab: NORMAL
Lab: NORMAL

## 2024-12-25 LAB
ALP BONE CFR SERPL: 30 % (ref 14–68)
ALP INTEST CFR SERPL: 19 % (ref 0–18)
ALP LIVER CFR SERPL: 51 % (ref 18–85)

## 2025-03-06 DIAGNOSIS — I10 ESSENTIAL (PRIMARY) HYPERTENSION: ICD-10-CM

## 2025-03-10 RX ORDER — HYDROCHLOROTHIAZIDE 25 MG/1
25 TABLET ORAL DAILY
Qty: 90 TABLET | Refills: 0 | OUTPATIENT
Start: 2025-03-10

## 2025-03-10 RX ORDER — AMLODIPINE BESYLATE 10 MG/1
10 TABLET ORAL DAILY
Qty: 90 TABLET | Refills: 0 | Status: SHIPPED | OUTPATIENT
Start: 2025-03-10 | End: 2025-04-19 | Stop reason: SDUPTHER

## 2025-04-13 DIAGNOSIS — I10 ESSENTIAL (PRIMARY) HYPERTENSION: ICD-10-CM

## 2025-04-13 DIAGNOSIS — E78.5 HYPERLIPIDEMIA, UNSPECIFIED HYPERLIPIDEMIA TYPE: ICD-10-CM

## 2025-04-14 RX ORDER — HYDROCHLOROTHIAZIDE 25 MG/1
25 TABLET ORAL DAILY
Qty: 90 TABLET | Refills: 0 | OUTPATIENT
Start: 2025-04-14

## 2025-04-14 RX ORDER — ATORVASTATIN CALCIUM 10 MG/1
10 TABLET, FILM COATED ORAL DAILY
Qty: 30 TABLET | Refills: 2 | OUTPATIENT
Start: 2025-04-14

## 2025-04-15 NOTE — TELEPHONE ENCOUNTER
Spoke with patients daughter and informed her that the patient needs to compete labs for additional refills.

## 2025-04-16 DIAGNOSIS — M25.50 GENERALIZED JOINT PAIN: ICD-10-CM

## 2025-04-16 DIAGNOSIS — R74.8 ELEVATED ALKALINE PHOSPHATASE LEVEL: ICD-10-CM

## 2025-04-16 DIAGNOSIS — E83.52 HYPERCALCEMIA: ICD-10-CM

## 2025-04-16 LAB — CALCIUM SERPL-MCNC: 10.4 MG/DL (ref 8.5–10.1)

## 2025-04-19 DIAGNOSIS — I10 ESSENTIAL (PRIMARY) HYPERTENSION: ICD-10-CM

## 2025-04-19 DIAGNOSIS — I10 ESSENTIAL (PRIMARY) HYPERTENSION: Primary | ICD-10-CM

## 2025-04-19 LAB
ALP BONE CFR SERPL: 37 % (ref 14–68)
ALP INTEST CFR SERPL: 15 % (ref 0–18)
ALP LIVER CFR SERPL: 48 % (ref 18–85)
ALP SERPL-CCNC: 144 IU/L (ref 44–121)

## 2025-04-19 RX ORDER — AMLODIPINE BESYLATE 10 MG/1
10 TABLET ORAL DAILY
Qty: 90 TABLET | Refills: 0 | Status: SHIPPED | OUTPATIENT
Start: 2025-04-19

## 2025-04-19 RX ORDER — AMLODIPINE BESYLATE 10 MG/1
10 TABLET ORAL DAILY
Qty: 90 TABLET | Refills: 0 | OUTPATIENT
Start: 2025-04-19

## 2025-04-19 RX ORDER — HYDROCHLOROTHIAZIDE 25 MG/1
25 TABLET ORAL DAILY
Qty: 90 TABLET | Refills: 0 | Status: SHIPPED | OUTPATIENT
Start: 2025-04-19

## 2025-04-19 NOTE — PROGRESS NOTES
On-call Provider message reply.     Assessment & Plan  Essential (primary) hypertension    On call provider contacted about a renewal refill of blood pressure medication. Patient's daughter left a message stating that primary PCP requested lab work to be obtained prior to refill request.  Labs were collected Thursday and now patient is out of her amlodipine and hctz. On call provider attempted to contact patient twice, and left a voicemail with instructions of care plan.  I have instructed the patient to contact the office Monday morning to schedule an acute appointment with either myself or my colleague.  I have sent refill to local pharmacy, due to the patient unable to rest last night, her reported pressure is high and they have been trying home remedies.    Orders:    amLODIPine (NORVASC) 10 MG tablet; Take 1 tablet by mouth daily    hydroCHLOROthiazide (HYDRODIURIL) 25 MG tablet; Take 1 tablet by mouth daily      Mc ANAYA, FNP-BC  4/19/2025

## 2025-04-20 LAB
CCP IGA+IGG SERPL IA-ACNC: <20 UNITS
REFLEX INFORMATION: ABNORMAL
RHEUMATOID FACT SERPL-ACNC: 20 IU/ML

## 2025-04-21 ENCOUNTER — RESULTS FOLLOW-UP (OUTPATIENT)
Dept: PRIMARY CARE CLINIC | Facility: CLINIC | Age: 70
End: 2025-04-21

## 2025-04-22 ENCOUNTER — TELEPHONE (OUTPATIENT)
Dept: PRIMARY CARE CLINIC | Facility: CLINIC | Age: 70
End: 2025-04-22

## 2025-04-22 LAB
ANA TITR SER IF: NEGATIVE
LABORATORY COMMENT REPORT: NORMAL

## 2025-04-22 NOTE — TELEPHONE ENCOUNTER
Patients representative said she got her labs done and was told to see Dr Carr this week for a follow up/medicine refill appointment.

## 2025-04-22 NOTE — TELEPHONE ENCOUNTER
Called patients daughter on PHI. Scheduled patient for tomorrow at 10am to discuss this. Per lab result.

## 2025-04-23 ENCOUNTER — OFFICE VISIT (OUTPATIENT)
Dept: PRIMARY CARE CLINIC | Facility: CLINIC | Age: 70
End: 2025-04-23
Payer: COMMERCIAL

## 2025-04-23 VITALS
SYSTOLIC BLOOD PRESSURE: 127 MMHG | OXYGEN SATURATION: 99 % | RESPIRATION RATE: 18 BRPM | HEIGHT: 59 IN | DIASTOLIC BLOOD PRESSURE: 75 MMHG | TEMPERATURE: 97.8 F | BODY MASS INDEX: 33.51 KG/M2 | WEIGHT: 166.2 LBS | HEART RATE: 89 BPM

## 2025-04-23 DIAGNOSIS — E78.5 HYPERLIPIDEMIA, UNSPECIFIED HYPERLIPIDEMIA TYPE: ICD-10-CM

## 2025-04-23 DIAGNOSIS — L30.9 DERMATITIS: ICD-10-CM

## 2025-04-23 DIAGNOSIS — E03.9 HYPOTHYROIDISM, UNSPECIFIED TYPE: ICD-10-CM

## 2025-04-23 DIAGNOSIS — E83.52 HIGH CALCIUM LEVELS: Primary | ICD-10-CM

## 2025-04-23 DIAGNOSIS — R73.03 PREDIABETES: ICD-10-CM

## 2025-04-23 DIAGNOSIS — I10 ESSENTIAL (PRIMARY) HYPERTENSION: ICD-10-CM

## 2025-04-23 PROCEDURE — 1123F ACP DISCUSS/DSCN MKR DOCD: CPT | Performed by: FAMILY MEDICINE

## 2025-04-23 PROCEDURE — 3074F SYST BP LT 130 MM HG: CPT | Performed by: FAMILY MEDICINE

## 2025-04-23 PROCEDURE — 3078F DIAST BP <80 MM HG: CPT | Performed by: FAMILY MEDICINE

## 2025-04-23 PROCEDURE — 99214 OFFICE O/P EST MOD 30 MIN: CPT | Performed by: FAMILY MEDICINE

## 2025-04-23 RX ORDER — FUROSEMIDE 20 MG/1
20 TABLET ORAL DAILY
Qty: 30 TABLET | Refills: 3 | Status: SHIPPED | OUTPATIENT
Start: 2025-04-23

## 2025-04-23 RX ORDER — ATORVASTATIN CALCIUM 10 MG/1
10 TABLET, FILM COATED ORAL DAILY
Qty: 90 TABLET | Refills: 1 | Status: SHIPPED | OUTPATIENT
Start: 2025-04-23

## 2025-04-23 RX ORDER — LEVOTHYROXINE SODIUM 75 UG/1
75 TABLET ORAL
Qty: 90 TABLET | Refills: 1 | Status: SHIPPED | OUTPATIENT
Start: 2025-04-23

## 2025-04-23 RX ORDER — AMLODIPINE BESYLATE 10 MG/1
10 TABLET ORAL DAILY
Qty: 90 TABLET | Refills: 1 | Status: SHIPPED | OUTPATIENT
Start: 2025-04-23

## 2025-04-23 SDOH — ECONOMIC STABILITY: FOOD INSECURITY: WITHIN THE PAST 12 MONTHS, THE FOOD YOU BOUGHT JUST DIDN'T LAST AND YOU DIDN'T HAVE MONEY TO GET MORE.: PATIENT DECLINED

## 2025-04-23 SDOH — ECONOMIC STABILITY: FOOD INSECURITY: WITHIN THE PAST 12 MONTHS, YOU WORRIED THAT YOUR FOOD WOULD RUN OUT BEFORE YOU GOT MONEY TO BUY MORE.: PATIENT DECLINED

## 2025-04-23 ASSESSMENT — PATIENT HEALTH QUESTIONNAIRE - PHQ9
SUM OF ALL RESPONSES TO PHQ QUESTIONS 1-9: 0
2. FEELING DOWN, DEPRESSED OR HOPELESS: NOT AT ALL
1. LITTLE INTEREST OR PLEASURE IN DOING THINGS: NOT AT ALL
SUM OF ALL RESPONSES TO PHQ QUESTIONS 1-9: 0

## 2025-04-23 NOTE — PROGRESS NOTES
HPI     Chief Complaint   Patient presents with    Discuss Medications     Discuss medications from lab results  Needs Triamcinolone acetonide cream 0.1%     She is a 69 y.o. female who presents for abnormal labs.     Recent labs show high calcium.  Not taking any additional calcium supplement just one a day MVN.  She has been on HCTZ.   Open to switching medication.  BP well controlled on repeat.    Has not set up appt with Rheum.  States joint pains improved on their own.     Asking for refills of Triamcinolone cream for dermatitis.     Reviewed PmHx, RxHx, FmHx, SocHx, AllgHx and updated and dated in the chart.    Physical Exam:  /75   Pulse 89   Temp 97.8 °F (36.6 °C) (Oral)   Resp 18   Ht 1.499 m (4' 11\")   Wt 75.4 kg (166 lb 3.2 oz)   SpO2 99%   BMI 33.57 kg/m²     WNWD NAD  RRR no murmur  CTA no wheezes ronchi rales  No focal deficits    No results found for this or any previous visit (from the past 12 hours).       Assessment / Plan     Kalina was seen today for discuss medications.    Diagnoses and all orders for this visit:    High calcium levels  -     Comprehensive Metabolic Panel; Future  -     Calcium Ionized Serum; Future  -     PTH, Intact; Future    Essential (primary) hypertension  -     furosemide (LASIX) 20 MG tablet; Take 1 tablet by mouth daily  -     amLODIPine (NORVASC) 10 MG tablet; Take 1 tablet by mouth daily    Hyperlipidemia, unspecified hyperlipidemia type  -     atorvastatin (LIPITOR) 10 MG tablet; Take 1 tablet by mouth daily    Hypothyroidism, unspecified type  -     levothyroxine (SYNTHROID) 75 MCG tablet; Take 1 tablet by mouth every morning (before breakfast)    Prediabetes  -     Hemoglobin A1C; Future    Dermatitis  -     triamcinolone (ARISTOCORT) 0.5 % cream; Apply topically 2 times daily       Switch HCTZ to Lasix. Discussed to call back if any issues with BP too low/ too high.   Check labs in 1 week after switching.     No follow-up provider specified.    I

## 2025-04-23 NOTE — PATIENT INSTRUCTIONS
[facility]  [phone]     Dr. Dante Fernando MD  [address], [place] [address]  Phone: [phone]

## 2025-04-23 NOTE — PROGRESS NOTES
Health Decision Maker has been checked with the patient   Primary Decision Maker: Patricia Walden - [phone]     AI form was signed    Chief Complaint   Patient presents with    Discuss Medications     Discuss medications from lab results       \"Have you been to the ER, urgent care clinic since your last visit?  Hospitalized since your last visit?\"    NO    “Have you seen or consulted any other health care providers outside of [facility] since your last visit?”    NO      Vitals:    04/23/25 1002   Temp: 97.8 °F (36.6 °C)   TempSrc: Oral   Weight: 75.4 kg (166 lb 3.2 oz)   Height: 1.499 m (4' 11\")      Depression: Not at risk (4/23/2025)    PHQ-2     PHQ-2 Score: 0          “Have you had a colorectal cancer screening such as a colonoscopy/FIT/Cologuard?    NO    No colonoscopy on file  No cologuard on file  No FIT/FOBT on file   No flexible sigmoidoscopy on file         Click Here for Release of Records Request    Chart reviewed: immunizations are documented.   Immunization History   Administered Date(s) Administered    COVID-19, MODERNA BLUE border, Primary or Immunocompromised, (age 12y+), IM, 100 mcg/0.5mL 02/14/2021, 03/14/2021, 12/02/2021    Influenza, FLUAD, (age 65 y+), IM, Quadv, 0.5mL 10/12/2023    Influenza, FLUZONE High Dose (age 65 y+), IM, Quadv, 0.7mL 10/19/2020    Pneumococcal, PPSV23, PNEUMOVAX 23, (age 2y+), SC/IM, 0.5mL 02/07/2022

## 2025-04-24 RX ORDER — TRIAMCINOLONE ACETONIDE 5 MG/G
CREAM TOPICAL 2 TIMES DAILY
Qty: 15 G | Refills: 2 | Status: SHIPPED | OUTPATIENT
Start: 2025-04-24

## 2025-05-10 LAB
ALBUMIN SERPL-MCNC: 4 G/DL (ref 3.9–4.9)
ALP SERPL-CCNC: 124 IU/L (ref 44–121)
ALT SERPL-CCNC: 15 IU/L (ref 0–32)
AST SERPL-CCNC: 18 IU/L (ref 0–40)
BILIRUB SERPL-MCNC: 0.4 MG/DL (ref 0–1.2)
BUN SERPL-MCNC: 13 MG/DL (ref 8–27)
BUN/CREAT SERPL: 22 (ref 12–28)
CALCIUM SERPL-MCNC: 10.2 MG/DL (ref 8.7–10.3)
CHLORIDE SERPL-SCNC: 102 MMOL/L (ref 96–106)
CO2 SERPL-SCNC: 27 MMOL/L (ref 20–29)
CREAT SERPL-MCNC: 0.6 MG/DL (ref 0.57–1)
EGFRCR SERPLBLD CKD-EPI 2021: 96 ML/MIN/1.73
EST. AVERAGE GLUCOSE BLD GHB EST-MCNC: 134 MG/DL
GLOBULIN SER CALC-MCNC: 2.9 G/DL (ref 1.5–4.5)
GLUCOSE SERPL-MCNC: 90 MG/DL (ref 70–99)
HBA1C MFR BLD: 6.3 % (ref 4.8–5.6)
POTASSIUM SERPL-SCNC: 4.4 MMOL/L (ref 3.5–5.2)
PROT SERPL-MCNC: 6.9 G/DL (ref 6–8.5)
SODIUM SERPL-SCNC: 141 MMOL/L (ref 134–144)

## 2025-05-11 ENCOUNTER — RESULTS FOLLOW-UP (OUTPATIENT)
Dept: PRIMARY CARE CLINIC | Facility: CLINIC | Age: 70
End: 2025-05-11

## 2025-05-11 LAB — CA-I SERPL ISE-MCNC: 5.6 MG/DL (ref 4.5–5.6)

## 2025-05-14 ENCOUNTER — OFFICE VISIT (OUTPATIENT)
Dept: PRIMARY CARE CLINIC | Facility: CLINIC | Age: 70
End: 2025-05-14
Payer: COMMERCIAL

## 2025-05-14 VITALS
RESPIRATION RATE: 19 BRPM | OXYGEN SATURATION: 98 % | SYSTOLIC BLOOD PRESSURE: 128 MMHG | HEIGHT: 59 IN | BODY MASS INDEX: 33.87 KG/M2 | DIASTOLIC BLOOD PRESSURE: 83 MMHG | WEIGHT: 168 LBS | HEART RATE: 96 BPM

## 2025-05-14 DIAGNOSIS — I10 PRIMARY HYPERTENSION: ICD-10-CM

## 2025-05-14 DIAGNOSIS — R76.8 RHEUMATOID FACTOR POSITIVE: ICD-10-CM

## 2025-05-14 DIAGNOSIS — R60.0 PEDAL EDEMA: ICD-10-CM

## 2025-05-14 DIAGNOSIS — T88.7XXA SIDE EFFECT OF MEDICATION: ICD-10-CM

## 2025-05-14 DIAGNOSIS — M25.50 GENERALIZED JOINT PAIN: Primary | ICD-10-CM

## 2025-05-14 PROCEDURE — 3074F SYST BP LT 130 MM HG: CPT | Performed by: FAMILY MEDICINE

## 2025-05-14 PROCEDURE — 3079F DIAST BP 80-89 MM HG: CPT | Performed by: FAMILY MEDICINE

## 2025-05-14 PROCEDURE — 1123F ACP DISCUSS/DSCN MKR DOCD: CPT | Performed by: FAMILY MEDICINE

## 2025-05-14 PROCEDURE — 99214 OFFICE O/P EST MOD 30 MIN: CPT | Performed by: FAMILY MEDICINE

## 2025-05-14 RX ORDER — AMLODIPINE BESYLATE 5 MG/1
5 TABLET ORAL DAILY
Qty: 90 TABLET | Refills: 1 | Status: SHIPPED | OUTPATIENT
Start: 2025-05-14

## 2025-05-14 NOTE — PROGRESS NOTES
Health Decision Maker has been checked with the patient     Primary Decision Maker: Patricia Walden - [phone]           Chief Complaint   Patient presents with    Medication Check     Recently changed BP medication due to calcium levels. Was having side effects so patient started taking half tab. Is now having leg swelling. Would like to change medication. Asked if she could go back to last medication. States she is having problems with the Furosemide        \"Have you been to the ER, urgent care clinic since your last visit?  Hospitalized since your last visit?\"    NO    “Have you seen or consulted any other health care providers outside of [facility] since your last visit?”    NO      Vitals:    05/14/25 1043   BP: (!) 152/82   BP Site: Left Upper Arm   Patient Position: Sitting   BP Cuff Size: Medium Adult   Pulse: 96   Resp: 19   SpO2: 98%   Weight: 76.2 kg (168 lb)   Height: 1.499 m (4' 11\")      Depression: Not at risk (4/23/2025)    PHQ-2     PHQ-2 Score: 0          “Have you had a colorectal cancer screening such as a colonoscopy/FIT/Cologuard?    NO    No colonoscopy on file  No cologuard on file  No FIT/FOBT on file   No flexible sigmoidoscopy on file         Click Here for Release of Records Request        Chart reviewed: immunizations are documented.   Immunization History   Administered Date(s) Administered    COVID-19, MODERNA BLUE border, Primary or Immunocompromised, (age 12y+), IM, 100 mcg/0.5mL 02/14/2021, 03/14/2021, 12/02/2021    Influenza, FLUAD, (age 65 y+), IM, Quadv, 0.5mL 10/12/2023    Influenza, FLUZONE High Dose (age 65 y+), IM, Quadv, 0.7mL 10/19/2020    Pneumococcal, PPSV23, PNEUMOVAX 23, (age 2y+), SC/IM, 0.5mL 02/07/2022

## 2025-05-14 NOTE — PROGRESS NOTES
HPI     Chief Complaint   Patient presents with    Medication Check     Recently changed BP medication due to calcium levels. Was having side effects so patient started taking half tab. Is now having leg swelling. Would like to change medication. Asked if she could go back to last medication. States she is having problems with the Furosemide      She is a 69 y.o. female who presents for med check.    Switched HCTZ to Lasix due to hyperCa. Calcium on repeat was normal after switch.  States she took full dose of Lasix for 2 days and BP was good as well as edema however it made her urinate more frequently so she cut the dose in half. Since then has had more pedal edema. No chest pain, shortness of breath or leg swelling/ pain/ redness or warmth. No recent travel or surgeries.   She did not take Lasix at all today.     Reviewed PmHx, RxHx, FmHx, SocHx, AllgHx and updated and dated in the chart.    Physical Exam:  /83   Pulse 96   Resp 19   Ht 1.499 m (4' 11\")   Wt 76.2 kg (168 lb)   SpO2 98%   BMI 33.93 kg/m²   Physical Exam  Vitals and nursing note reviewed.   Constitutional:       General: She is not in acute distress.     Appearance: Normal appearance. She is not ill-appearing.   Cardiovascular:      Rate and Rhythm: Normal rate and regular rhythm.      Heart sounds: No murmur heard.  Pulmonary:      Effort: Pulmonary effort is normal. No respiratory distress.      Breath sounds: Normal breath sounds. No wheezing, rhonchi or rales.   Musculoskeletal:      Right lower leg: Edema present.      Left lower leg: Edema present.      Comments: 1+ pedal edema BL. No redness/ warmth or calf swelling/ tenderness to palpation.    Neurological:      Mental Status: She is alert.       No results found for this or any previous visit (from the past 12 hours).       Assessment / Plan     Kalina was seen today for medication check.    Diagnoses and all orders for this visit:    Generalized joint pain  -     [facility] -  2 seconds or less

## 2025-05-14 NOTE — PATIENT INSTRUCTIONS
Take 5mg of Amlodipine daily. (New Rx sent in)  Take the full tab of Lasix 20mg daily.   Wear compression stockings during the day.  Keep feet elevated when laying down.   Come back in 2 weeks.

## 2025-05-27 RX ORDER — HYDROCHLOROTHIAZIDE 25 MG/1
25 TABLET ORAL DAILY
Qty: 90 TABLET | OUTPATIENT
Start: 2025-05-27

## 2025-06-28 DIAGNOSIS — L30.9 DERMATITIS: ICD-10-CM

## 2025-07-02 RX ORDER — HYDROCHLOROTHIAZIDE 25 MG/1
25 TABLET ORAL DAILY
Qty: 90 TABLET | OUTPATIENT
Start: 2025-07-02

## 2025-07-02 RX ORDER — TRIAMCINOLONE ACETONIDE 5 MG/G
CREAM TOPICAL 2 TIMES DAILY
Qty: 15 G | Refills: 2 | Status: SHIPPED | OUTPATIENT
Start: 2025-07-02

## 2025-07-07 ENCOUNTER — OFFICE VISIT (OUTPATIENT)
Dept: PRIMARY CARE CLINIC | Facility: CLINIC | Age: 70
End: 2025-07-07
Payer: COMMERCIAL

## 2025-07-07 VITALS
DIASTOLIC BLOOD PRESSURE: 73 MMHG | WEIGHT: 165 LBS | HEIGHT: 59 IN | TEMPERATURE: 97.9 F | SYSTOLIC BLOOD PRESSURE: 118 MMHG | RESPIRATION RATE: 18 BRPM | BODY MASS INDEX: 33.26 KG/M2 | OXYGEN SATURATION: 98 % | HEART RATE: 91 BPM

## 2025-07-07 DIAGNOSIS — I10 PRIMARY HYPERTENSION: ICD-10-CM

## 2025-07-07 DIAGNOSIS — Z12.11 SCREENING FOR COLON CANCER: ICD-10-CM

## 2025-07-07 DIAGNOSIS — I10 ESSENTIAL (PRIMARY) HYPERTENSION: ICD-10-CM

## 2025-07-07 DIAGNOSIS — R73.03 PREDIABETES: Primary | ICD-10-CM

## 2025-07-07 DIAGNOSIS — L85.3 DRY SKIN: ICD-10-CM

## 2025-07-07 PROCEDURE — 99214 OFFICE O/P EST MOD 30 MIN: CPT | Performed by: FAMILY MEDICINE

## 2025-07-07 PROCEDURE — 1123F ACP DISCUSS/DSCN MKR DOCD: CPT | Performed by: FAMILY MEDICINE

## 2025-07-07 PROCEDURE — 3078F DIAST BP <80 MM HG: CPT | Performed by: FAMILY MEDICINE

## 2025-07-07 PROCEDURE — 3074F SYST BP LT 130 MM HG: CPT | Performed by: FAMILY MEDICINE

## 2025-07-07 RX ORDER — FUROSEMIDE 20 MG/1
20 TABLET ORAL DAILY
Qty: 90 TABLET | Refills: 1 | Status: SHIPPED | OUTPATIENT
Start: 2025-07-07

## 2025-07-07 NOTE — PATIENT INSTRUCTIONS
You can try eating a protein like hard boiled eggs or turkey sausage or some type of lean protein. You can some low fat cheese. And then also some fruit. Try to stay away from a lot of carbs like oatmeal, cereals and limit these to just a few times a week.   You can have whole fruits, nuts, slice of cheese, celery and peanut butter as examples of snacks.   Eat whole grains, look into lower carbohydrate bread, stick to small amounts of rice.

## 2025-07-07 NOTE — PROGRESS NOTES
Health Decision Maker has been checked with the patient   Primary Decision Maker: Patricia Walden - [phone]     AI form was signed    Chief Complaint   Patient presents with    Medication Adjustment     Amlodipine adjustment  lasix    Follow-up     Cologuard kit       \"Have you been to the ER, urgent care clinic since your last visit?  Hospitalized since your last visit?\"    NO    “Have you seen or consulted any other health care providers outside of [facility] since your last visit?”    NO      Vitals:    07/07/25 1021   Resp: 18   Temp: 97.9 °F (36.6 °C)   TempSrc: Oral   Weight: 74.8 kg (165 lb)   Height: 1.499 m (4' 11\")      Depression: Not at risk (4/23/2025)    PHQ-2     PHQ-2 Score: 0          “Have you had a colorectal cancer screening such as a colonoscopy/FIT/Cologuard?    NO    No colonoscopy on file  No cologuard on file  No FIT/FOBT on file   No flexible sigmoidoscopy on file         Click Here for Release of Records Request    Chart reviewed: immunizations are documented.   Immunization History   Administered Date(s) Administered    COVID-19, MODERNA BLUE border, Primary or Immunocompromised, (age 12y+), IM, 100 mcg/0.5mL 02/14/2021, 03/14/2021, 12/02/2021    Influenza, FLUAD, (age 65 y+), IM, Quadv, 0.5mL 10/12/2023    Influenza, FLUZONE High Dose (age 65 y+), IM, Quadv, 0.7mL 10/19/2020    Pneumococcal, PPSV23, PNEUMOVAX 23, (age 2y+), SC/IM, 0.5mL 02/07/2022

## 2025-07-07 NOTE — PROGRESS NOTES
HPI     Chief Complaint   Patient presents with    Medication Adjustment     Amlodipine adjustment  lasix    Follow-up     Cologuard kit     She is a 69 y.o. female who presents for follow up.    Lasix is working better. Taking Amlodipine 5mg daily. No side effects.     A1c was in prediabetic range. Asking what she can do dietary wise. Does not want to see RD. Eats yogurt/ oatmeal/ cereal in AM typically, snacks on crackers or fruits and often eats rice or bread with meals. Working on portions.     No fam hx of colon cancer. No blood in the stool. No abdominal pain.     States her skin is dry. Not using much lotion according to her grand daughter. No rashes.     Reviewed PmHx, RxHx, FmHx, SocHx, AllgHx and updated and dated in the chart.    Physical Exam:  /73   Pulse 91   Temp 97.9 °F (36.6 °C) (Oral)   Resp 18   Ht 1.499 m (4' 11\")   Wt 74.8 kg (165 lb)   SpO2 98%   BMI 33.33 kg/m²   Physical Exam  Vitals and nursing note reviewed.   Constitutional:       General: She is not in acute distress.     Appearance: Normal appearance. She is not ill-appearing.   Cardiovascular:      Rate and Rhythm: Normal rate and regular rhythm.      Heart sounds: No murmur heard.  Pulmonary:      Effort: Pulmonary effort is normal. No respiratory distress.      Breath sounds: Normal breath sounds. No wheezing, rhonchi or rales.   Skin:     Comments: Skin appears dry without rashes, erythema or pruritus.    Neurological:      General: No focal deficit present.      Mental Status: She is alert.   Psychiatric:         Mood and Affect: Mood normal.         Behavior: Behavior normal.         No results found for this or any previous visit (from the past 12 hours).       Assessment / Plan     Kalina was seen today for medication adjustment and follow-up.    Diagnoses and all orders for this visit:    Prediabetes  -     Basic Metabolic Panel; Future  -     Hemoglobin A1C; Future    Screening for colon cancer  -     Cologuard